# Patient Record
Sex: FEMALE | Race: ASIAN | Employment: FULL TIME | URBAN - METROPOLITAN AREA
[De-identification: names, ages, dates, MRNs, and addresses within clinical notes are randomized per-mention and may not be internally consistent; named-entity substitution may affect disease eponyms.]

---

## 2017-12-26 ENCOUNTER — APPOINTMENT (OUTPATIENT)
Dept: CT IMAGING | Facility: CLINIC | Age: 57
End: 2017-12-26
Attending: EMERGENCY MEDICINE

## 2017-12-26 ENCOUNTER — HOSPITAL ENCOUNTER (EMERGENCY)
Facility: CLINIC | Age: 57
Discharge: HOME | End: 2017-12-26
Attending: EMERGENCY MEDICINE | Admitting: EMERGENCY MEDICINE

## 2017-12-26 VITALS
RESPIRATION RATE: 20 BRPM | HEART RATE: 78 BPM | TEMPERATURE: 98.2 F | DIASTOLIC BLOOD PRESSURE: 95 MMHG | OXYGEN SATURATION: 98 % | SYSTOLIC BLOOD PRESSURE: 144 MMHG

## 2017-12-26 DIAGNOSIS — R91.1 PULMONARY NODULE: ICD-10-CM

## 2017-12-26 DIAGNOSIS — R10.32 LLQ ABDOMINAL PAIN: ICD-10-CM

## 2017-12-26 LAB
ALBUMIN UR-MCNC: NEGATIVE MG/DL
ANION GAP SERPL CALCULATED.3IONS-SCNC: 10 MMOL/L (ref 3–14)
APPEARANCE UR: CLEAR
BASOPHILS # BLD AUTO: 0.1 10E9/L (ref 0–0.2)
BASOPHILS NFR BLD AUTO: 0.5 %
BILIRUB UR QL STRIP: NEGATIVE
BUN SERPL-MCNC: 22 MG/DL (ref 7–30)
CALCIUM SERPL-MCNC: 8.9 MG/DL (ref 8.5–10.1)
CHLORIDE SERPL-SCNC: 107 MMOL/L (ref 94–109)
CO2 SERPL-SCNC: 23 MMOL/L (ref 20–32)
COLOR UR AUTO: ABNORMAL
CREAT SERPL-MCNC: 0.59 MG/DL (ref 0.52–1.04)
DIFFERENTIAL METHOD BLD: NORMAL
EOSINOPHIL # BLD AUTO: 0.2 10E9/L (ref 0–0.7)
EOSINOPHIL NFR BLD AUTO: 2.2 %
ERYTHROCYTE [DISTWIDTH] IN BLOOD BY AUTOMATED COUNT: 12.4 % (ref 10–15)
GFR SERPL CREATININE-BSD FRML MDRD: >90 ML/MIN/1.7M2
GLUCOSE SERPL-MCNC: 85 MG/DL (ref 70–99)
GLUCOSE UR STRIP-MCNC: NEGATIVE MG/DL
HCT VFR BLD AUTO: 41.3 % (ref 35–47)
HGB BLD-MCNC: 13.9 G/DL (ref 11.7–15.7)
HGB UR QL STRIP: NEGATIVE
IMM GRANULOCYTES # BLD: 0 10E9/L (ref 0–0.4)
IMM GRANULOCYTES NFR BLD: 0.2 %
KETONES UR STRIP-MCNC: NEGATIVE MG/DL
LEUKOCYTE ESTERASE UR QL STRIP: ABNORMAL
LYMPHOCYTES # BLD AUTO: 2.7 10E9/L (ref 0.8–5.3)
LYMPHOCYTES NFR BLD AUTO: 29.3 %
MCH RBC QN AUTO: 32.4 PG (ref 26.5–33)
MCHC RBC AUTO-ENTMCNC: 33.7 G/DL (ref 31.5–36.5)
MCV RBC AUTO: 96 FL (ref 78–100)
MONOCYTES # BLD AUTO: 0.6 10E9/L (ref 0–1.3)
MONOCYTES NFR BLD AUTO: 6.4 %
MUCOUS THREADS #/AREA URNS LPF: PRESENT /LPF
NEUTROPHILS # BLD AUTO: 5.7 10E9/L (ref 1.6–8.3)
NEUTROPHILS NFR BLD AUTO: 61.4 %
NITRATE UR QL: NEGATIVE
NRBC # BLD AUTO: 0 10*3/UL
NRBC BLD AUTO-RTO: 0 /100
PH UR STRIP: 6 PH (ref 5–7)
PLATELET # BLD AUTO: 261 10E9/L (ref 150–450)
POTASSIUM SERPL-SCNC: 3.7 MMOL/L (ref 3.4–5.3)
RBC # BLD AUTO: 4.29 10E12/L (ref 3.8–5.2)
RBC #/AREA URNS AUTO: 1 /HPF (ref 0–2)
SODIUM SERPL-SCNC: 140 MMOL/L (ref 133–144)
SOURCE: ABNORMAL
SP GR UR STRIP: 1.02 (ref 1–1.03)
SQUAMOUS #/AREA URNS AUTO: 1 /HPF (ref 0–1)
UROBILINOGEN UR STRIP-MCNC: 0 MG/DL (ref 0–2)
WBC # BLD AUTO: 9.3 10E9/L (ref 4–11)
WBC #/AREA URNS AUTO: 1 /HPF (ref 0–2)

## 2017-12-26 PROCEDURE — 85025 COMPLETE CBC W/AUTO DIFF WBC: CPT | Performed by: EMERGENCY MEDICINE

## 2017-12-26 PROCEDURE — 81001 URINALYSIS AUTO W/SCOPE: CPT | Performed by: EMERGENCY MEDICINE

## 2017-12-26 PROCEDURE — 99285 EMERGENCY DEPT VISIT HI MDM: CPT | Mod: 25

## 2017-12-26 PROCEDURE — 25000128 H RX IP 250 OP 636: Performed by: EMERGENCY MEDICINE

## 2017-12-26 PROCEDURE — 74177 CT ABD & PELVIS W/CONTRAST: CPT

## 2017-12-26 PROCEDURE — 96374 THER/PROPH/DIAG INJ IV PUSH: CPT | Mod: 59

## 2017-12-26 PROCEDURE — 80048 BASIC METABOLIC PNL TOTAL CA: CPT | Performed by: EMERGENCY MEDICINE

## 2017-12-26 RX ORDER — IOPAMIDOL 755 MG/ML
500 INJECTION, SOLUTION INTRAVASCULAR ONCE
Status: COMPLETED | OUTPATIENT
Start: 2017-12-26 | End: 2017-12-26

## 2017-12-26 RX ORDER — KETOROLAC TROMETHAMINE 30 MG/ML
30 INJECTION, SOLUTION INTRAMUSCULAR; INTRAVENOUS ONCE
Status: COMPLETED | OUTPATIENT
Start: 2017-12-26 | End: 2017-12-26

## 2017-12-26 RX ORDER — HYDROCODONE BITARTRATE AND ACETAMINOPHEN 5; 325 MG/1; MG/1
1-2 TABLET ORAL EVERY 4 HOURS PRN
Qty: 15 TABLET | Refills: 0 | Status: ON HOLD | OUTPATIENT
Start: 2017-12-26 | End: 2020-11-23

## 2017-12-26 RX ADMIN — SODIUM CHLORIDE 58 ML: 9 INJECTION, SOLUTION INTRAVENOUS at 14:47

## 2017-12-26 RX ADMIN — KETOROLAC TROMETHAMINE 30 MG: 30 INJECTION, SOLUTION INTRAMUSCULAR at 14:18

## 2017-12-26 RX ADMIN — IOPAMIDOL 72 ML: 755 INJECTION, SOLUTION INTRAVENOUS at 14:47

## 2017-12-26 ASSESSMENT — ENCOUNTER SYMPTOMS
VOMITING: 0
FREQUENCY: 1
FEVER: 0
ABDOMINAL PAIN: 1
DIARRHEA: 0
BACK PAIN: 1
DIFFICULTY URINATING: 0
NAUSEA: 0
DYSURIA: 0
HEMATURIA: 0

## 2017-12-26 NOTE — DISCHARGE INSTRUCTIONS
Abdominal Pain    Abdominal pain is pain in the stomach or belly area. Everyone has this pain from time to time. In many cases it goes away on its own. But abdominal pain can sometimes be due to a serious problem, such as appendicitis. So it s important to know when to seek help.  Causes of abdominal pain  There are many possible causes of abdominal pain. Common causes in adults include:    Constipation, diarrhea, or gas    Stomach acid flowing back up into the esophagus (acid reflux or heartburn)    Severe acid reflux, called GERD (gastroesophageal reflux disease)    A sore in the lining of the stomach or small intestine (peptic ulcer)    Inflammation of the gallbladder, liver, or pancreas    Gallstones or kidney stones    Appendicitis     Intestinal blockage     An internal organ pushing through a muscle or other tissue (hernia)    Urinary tract infections    In women, menstrual cramps, fibroids, or endometriosis    Inflammation or infection of the intestines  Diagnosing the cause of abdominal pain  Your healthcare provider will do a physical exam help find the cause of your pain. If needed, tests will be ordered. Belly pain has many possible causes. So it can be hard to find the reason for your pain. Giving details about your pain can help. Tell your provider where and when you feel the pain, and what makes it better or worse. Also let your provider know if you have other symptoms such as:    Fever    Tiredness    Upset stomach (nausea)    Vomiting    Changes in bathroom habits  Treating abdominal pain  Some causes of pain need emergency medical treatment right away. These include appendicitis or a bowel blockage. Other problems can be treated with rest, fluids, or medicines. Your healthcare provider can give you specific instructions for treatment or self-care based on what is causing your pain.  If you have vomiting or diarrhea, sip water or other clear fluids. When you are ready to eat solid foods again,  start with small amounts of easy-to-digest, low-fat foods. These include apple sauce, toast, or crackers.   When to seek medical care  Call 911 or go to the hospital right away if you:    Can t pass stool and are vomiting    Are vomiting blood or have bloody diarrhea or black, tarry diarrhea    Have chest, neck, or shoulder pain    Feel like you might pass out    Have pain in your shoulder blades with nausea    Have sudden, severe belly pain    Have new, severe pain unlike any you have felt before    Have a belly that is rigid, hard, and tender to touch  Call your healthcare provider if you have:    Pain for more than 5 days    Bloating for more than 2 days    Diarrhea for more than 5 days    A fever of 100.4 F (38.0 C) or higher, or as directed by your provider    Pain that gets worse    Weight loss for no reason    Continued lack of appetite    Blood in your stool  How to prevent abdominal pain  Here are some tips to help prevent abdominal pain:    Eat smaller amounts of food at one time.    Avoid greasy, fried, or other high-fat foods.    Avoid foods that give you gas.    Exercise regularly.    Drink plenty of fluids.  To help prevent GERD symptoms:    Quit smoking.    Reduce alcohol and certain foods that increase stomach acid.    Avoid aspirin and over-the-counter pain and fever medicines (NSAIDS or nonsteroidal anti-inflammatory drugs), if possible    Lose extra weight.    Finish eating at least 2 hours before you go to bed or lie down.    Raise the head of your bed.  Date Last Reviewed: 7/1/2016 2000-2017 [employer]. [address] [place] [address], [place] [address]. All rights reserved. This information is not intended as a substitute for professional medical care. Always follow your healthcare professional's instructions.        Pulmonary Nodule  A pulmonary nodule is small area of abnormal tissue in the lung. It is usually found on an X-ray taken for other reasons. It is a single spot (lesion) up  to about an inch in size, surrounded by normal lung tissue.  Most nodules are not cancerous (benign). However, a nodule could be an early stage of lung cancer. Or it may be a sign of cancer that has spread from another part of the body. When a nodule is found on a chest X-ray, further testing is needed to determine if it is benign or cancerous (malignant). To give your healthcare provider more information about the nodule, you may have one or more of these tests:    Comparison of a new X-ray to earlier X-rays    Chest CT scan    Bronchoscopy (a procedure that allows the healthcare provider to see the air passages inside the lung)    Needle biopsy  Test results    If your nodule is benign, continued follow-up over the next 5 years is usually advised.    If tests do not determine whether your nodule is benign or malignant, surgery may be advised.    If tests show that the nodule is definitely malignant, surgery will probably be advised.  The best survival rates from lung cancer occur when the original tumor is small (less than 1 inch). Follow your healthcare provider's advice on the timing of further testing. Prompt treatment gives the best chance of curing lung cancer.  Prevention  Smoking remains one of the biggest risk factors for lung cancer. If you smoke, it is essential that you quit to lower your risk of lung cancer. Talk to your healthcare provider about things that can help you quit, including medicines and support groups. See the following websites for more information:    www.smokefree.gov    www.quitnet.com  Home care  Most people with a pulmonary nodule have no symptoms. So no special home care is required. You may return to your usual activities and diet.  Follow-up care  Follow up with your healthcare provider, or as advised.  More information about lung cancer is available from these resources:    American Lung Association: 357.592.7552, www.lung.org    [employer] [place]: 311.480.6436,  www.cancer.gov  When to seek medical advice  Call your healthcare provider right away if any of these occur:    Fever of 100.4 F (38 C) or higher    Unintended weight change  Call 911  Contact emergency services right away if any of these occur:    Coughing up blood    Chest pain or shortness of breath  Date Last Reviewed: 9/13/2015 2000-2017 [employer]. [address] [place] [address]. All rights reserved. This information is not intended as a substitute for professional medical care. Always follow your healthcare professional's instructions.

## 2017-12-26 NOTE — ED NOTES
Patient arrives complaining of left side abdomen and back pain that started on Friday and has gotten worse. She is alert and oriented, ABCs intact.

## 2017-12-26 NOTE — ED AVS SNAPSHOT
[facility] Emergency Department    [address]    [facility] [address]    Phone:  [phone]    Fax:  [phone]                                       Day Russell   MRN: [id number]    Department:  [facility] Emergency Department   Date of Visit:  12/26/2017           Patient Information     Date Of Birth          1960        Your diagnoses for this visit were:     LLQ abdominal pain     Pulmonary nodule        You were seen by James Whitman DO.      Follow-up Information     Follow up with Keli Guzmán PA-C. Call in 2 days.    Specialty:  Pediatrics    Why:  As needed    Contact information:    [facility]  [address]  [facility] [address]  [phone]          Follow up with [facility] Emergency Department.    Specialty:  EMERGENCY MEDICINE    Why:  If symptoms worsen    Contact information:    [address]  [facility] [address]  [phone]        Discharge Instructions         Abdominal Pain    Abdominal pain is pain in the stomach or belly area. Everyone has this pain from time to time. In many cases it goes away on its own. But abdominal pain can sometimes be due to a serious problem, such as appendicitis. So it s important to know when to seek help.  Causes of abdominal pain  There are many possible causes of abdominal pain. Common causes in adults include:    Constipation, diarrhea, or gas    Stomach acid flowing back up into the esophagus (acid reflux or heartburn)    Severe acid reflux, called GERD (gastroesophageal reflux disease)    A sore in the lining of the stomach or small intestine (peptic ulcer)    Inflammation of the gallbladder, liver, or pancreas    Gallstones or kidney stones    Appendicitis     Intestinal blockage     An internal organ pushing through a muscle or other tissue (hernia)    Urinary tract infections    In women, menstrual cramps, fibroids, or  endometriosis    Inflammation or infection of the intestines  Diagnosing the cause of abdominal pain  Your healthcare provider will do a physical exam help find the cause of your pain. If needed, tests will be ordered. Belly pain has many possible causes. So it can be hard to find the reason for your pain. Giving details about your pain can help. Tell your provider where and when you feel the pain, and what makes it better or worse. Also let your provider know if you have other symptoms such as:    Fever    Tiredness    Upset stomach (nausea)    Vomiting    Changes in bathroom habits  Treating abdominal pain  Some causes of pain need emergency medical treatment right away. These include appendicitis or a bowel blockage. Other problems can be treated with rest, fluids, or medicines. Your healthcare provider can give you specific instructions for treatment or self-care based on what is causing your pain.  If you have vomiting or diarrhea, sip water or other clear fluids. When you are ready to eat solid foods again, start with small amounts of easy-to-digest, low-fat foods. These include apple sauce, toast, or crackers.   When to seek medical care  Call 911 or go to the hospital right away if you:    Can t pass stool and are vomiting    Are vomiting blood or have bloody diarrhea or black, tarry diarrhea    Have chest, neck, or shoulder pain    Feel like you might pass out    Have pain in your shoulder blades with nausea    Have sudden, severe belly pain    Have new, severe pain unlike any you have felt before    Have a belly that is rigid, hard, and tender to touch  Call your healthcare provider if you have:    Pain for more than 5 days    Bloating for more than 2 days    Diarrhea for more than 5 days    A fever of 100.4 F (38.0 C) or higher, or as directed by your provider    Pain that gets worse    Weight loss for no reason    Continued lack of appetite    Blood in your stool  How to prevent abdominal pain  Here are  some tips to help prevent abdominal pain:    Eat smaller amounts of food at one time.    Avoid greasy, fried, or other high-fat foods.    Avoid foods that give you gas.    Exercise regularly.    Drink plenty of fluids.  To help prevent GERD symptoms:    Quit smoking.    Reduce alcohol and certain foods that increase stomach acid.    Avoid aspirin and over-the-counter pain and fever medicines (NSAIDS or nonsteroidal anti-inflammatory drugs), if possible    Lose extra weight.    Finish eating at least 2 hours before you go to bed or lie down.    Raise the head of your bed.  Date Last Reviewed: 7/1/2016 2000-2017 [employer]. [address] [place] [id number]. All rights reserved. This information is not intended as a substitute for professional medical care. Always follow your healthcare professional's instructions.        Pulmonary Nodule  A pulmonary nodule is small area of abnormal tissue in the lung. It is usually found on an X-ray taken for other reasons. It is a single spot (lesion) up to about an inch in size, surrounded by normal lung tissue.  Most nodules are not cancerous (benign). However, a nodule could be an early stage of lung cancer. Or it may be a sign of cancer that has spread from another part of the body. When a nodule is found on a chest X-ray, further testing is needed to determine if it is benign or cancerous (malignant). To give your healthcare provider more information about the nodule, you may have one or more of these tests:    Comparison of a new X-ray to earlier X-rays    Chest CT scan    Bronchoscopy (a procedure that allows the healthcare provider to see the air passages inside the lung)    Needle biopsy  Test results    If your nodule is benign, continued follow-up over the next 5 years is usually advised.    If tests do not determine whether your nodule is benign or malignant, surgery may be advised.    If tests show that the nodule is definitely malignant,  surgery will probably be advised.  The best survival rates from lung cancer occur when the original tumor is small (less than 1 inch). Follow your healthcare provider's advice on the timing of further testing. Prompt treatment gives the best chance of curing lung cancer.  Prevention  Smoking remains one of the biggest risk factors for lung cancer. If you smoke, it is essential that you quit to lower your risk of lung cancer. Talk to your healthcare provider about things that can help you quit, including medicines and support groups. See the following websites for more information:    www.smokefree.gov    www.quitnet.com  Home care  Most people with a pulmonary nodule have no symptoms. So no special home care is required. You may return to your usual activities and diet.  Follow-up care  Follow up with your healthcare provider, or as advised.  More information about lung cancer is available from these resources:    American Lung Association: 499.590.3358, www.lung.org    National Cancer [place]: 474.390.3016, www.cancer.gov  When to seek medical advice  Call your healthcare provider right away if any of these occur:    Fever of 100.4 F (38 C) or higher    Unintended weight change  Call 911  Contact emergency services right away if any of these occur:    Coughing up blood    Chest pain or shortness of breath  Date Last Reviewed: 9/13/2015 2000-2017 [employer]. [address] [place] [address], [place] [address]. All rights reserved. This information is not intended as a substitute for professional medical care. Always follow your healthcare professional's instructions.          24 Hour Appointment Hotline       To make an appointment at any [facility], call [phone] ([phone]). If you don't have a family doctor or clinic, we will help you find one. [facility] are conveniently located to serve the needs of you and your family.             Review of your medicines      START taking         Dose / Directions Last dose taken    HYDROcodone-acetaminophen 5-325 MG per tablet   Commonly known as:  NORCO   Dose:  1-2 tablet   Quantity:  15 tablet        Take 1-2 tablets by mouth every 4 hours as needed for moderate to severe pain   Refills:  0          Our records show that you are taking the medicines listed below. If these are incorrect, please call your family doctor or clinic.        Dose / Directions Last dose taken    ADVIL 200 MG tablet   Quantity:  120   Generic drug:  ibuprofen        1 TABLET EVERY 4 TO 6 HOURS AS NEEDED   Refills:  0        celeBREX 200 MG capsule   Quantity:  30   Generic drug:  celecoxib        1 Capsule daily   Refills:  5        * DEPO-PROVERA 150 MG/ML injection   Generic drug:  medroxyPROGESTERone        1 ml every 12 weeks   Refills:  0        * DEPO-PROVERA 150 MG/ML injection   Quantity:  .9   Generic drug:  medroxyPROGESTERone        given now   Refills:  0        * DEPO-PROVERA 150 MG/ML injection   Quantity:  1 ml   Generic drug:  medroxyPROGESTERone        1 ml every 12 weeks   Refills:  4        naproxen 500 MG tablet   Commonly known as:  NAPROSYN   Quantity:  60        1 TABLET TWICE DAILY   Refills:  0        omeprazole 40 MG capsule   Commonly known as:  priLOSEC   Quantity:  30        1 CAPSULE DAILY in the morning   Refills:  5        order for DME   Quantity:  2        lft and rt wrist braces   Refills:  0        * Notice:  This list has 3 medication(s) that are the same as other medications prescribed for you. Read the directions carefully, and ask your doctor or other care provider to review them with you.            Prescriptions were sent or printed at these locations (1 Prescription)                   Other Prescriptions                Printed at Department/Unit printer (1 of 1)         HYDROcodone-acetaminophen (NORCO) 5-325 MG per tablet                Procedures and tests performed during your visit     Basic metabolic panel    CBC with platelets  differential    CT Abdomen Pelvis w Contrast    Peripheral IV catheter    UA with Microscopic      Orders Needing Specimen Collection     None      Pending Results     Date and Time Order Name Status Description    12/26/2017 1331 CT Abdomen Pelvis w Contrast Preliminary             Pending Culture Results     No orders found from 12/24/2017 to 12/27/2017.            Pending Results Instructions     If you had any lab results that were not finalized at the time of your Discharge, you can call the ED Lab Result RN at [phone]. You will be contacted by this team for any positive Lab results or changes in treatment. The nurses are available 7 days a week from 10A to 6:30P.  You can leave a message 24 hours per day and they will return your call.        Test Results From Your Hospital Stay        12/26/2017  1:06 PM      Component Results     Component Value Ref Range & Units Status    Color Urine Straw  Final    Appearance Urine Clear  Final    Glucose Urine Negative NEG^Negative mg/dL Final    Bilirubin Urine Negative NEG^Negative Final    Ketones Urine Negative NEG^Negative mg/dL Final    Specific Gravity Urine 1.019 1.003 - 1.035 Final    Blood Urine Negative NEG^Negative Final    pH Urine 6.0 5.0 - 7.0 pH Final    Protein Albumin Urine Negative NEG^Negative mg/dL Final    Urobilinogen mg/dL 0.0 0.0 - 2.0 mg/dL Final    Nitrite Urine Negative NEG^Negative Final    Leukocyte Esterase Urine Trace (A) NEG^Negative Final    Source Midstream Urine  Final    WBC Urine 1 0 - 2 /HPF Final    RBC Urine 1 0 - 2 /HPF Final    Squamous Epithelial /HPF Urine 1 0 - 1 /HPF Final    Mucous Urine Present (A) NEG^Negative /LPF Final         12/26/2017  2:13 PM      Component Results     Component Value Ref Range & Units Status    WBC 9.3 4.0 - 11.0 10e9/L Final    RBC Count 4.29 3.8 - 5.2 10e12/L Final    Hemoglobin 13.9 11.7 - 15.7 g/dL Final    Hematocrit 41.3 35.0 - 47.0 % Final    MCV 96 78 - 100 fl Final    MCH 32.4 26.5 -  33.0 pg Final    MCHC 33.7 31.5 - 36.5 g/dL Final    RDW 12.4 10.0 - 15.0 % Final    Platelet Count 261 150 - 450 10e9/L Final    Diff Method Automated Method  Final    % Neutrophils 61.4 % Final    % Lymphocytes 29.3 % Final    % Monocytes 6.4 % Final    % Eosinophils 2.2 % Final    % Basophils 0.5 % Final    % Immature Granulocytes 0.2 % Final    Nucleated RBCs 0 0 /100 Final    Absolute Neutrophil 5.7 1.6 - 8.3 10e9/L Final    Absolute Lymphocytes 2.7 0.8 - 5.3 10e9/L Final    Absolute Monocytes 0.6 0.0 - 1.3 10e9/L Final    Absolute Eosinophils 0.2 0.0 - 0.7 10e9/L Final    Absolute Basophils 0.1 0.0 - 0.2 10e9/L Final    Abs Immature Granulocytes 0.0 0 - 0.4 10e9/L Final    Absolute Nucleated RBC 0.0  Final         12/26/2017  2:29 PM      Component Results     Component Value Ref Range & Units Status    Sodium 140 133 - 144 mmol/L Final    Potassium 3.7 3.4 - 5.3 mmol/L Final    Chloride 107 94 - 109 mmol/L Final    Carbon Dioxide 23 20 - 32 mmol/L Final    Anion Gap 10 3 - 14 mmol/L Final    Glucose 85 70 - 99 mg/dL Final    Urea Nitrogen 22 7 - 30 mg/dL Final    Creatinine 0.59 0.52 - 1.04 mg/dL Final    GFR Estimate >90 >60 mL/min/1.7m2 Final    Non  GFR Calc    GFR Estimate If Black >90 >60 mL/min/1.7m2 Final    African American GFR Calc    Calcium 8.9 8.5 - 10.1 mg/dL Final         12/26/2017  3:03 PM      Narrative     CT ABDOMEN AND PELVIS WITH CONTRAST   12/26/2017 2:48 PM     HISTORY: Left lower quadrant pain.    TECHNIQUE: CT abdomen and pelvis with 72mL Isovue-370 IV. Radiation  dose for this scan was reduced using automated exposure control,  adjustment of the mA and/or kV according to patient size, or iterative  reconstruction technique.    COMPARISON: None.    FINDINGS: No acute infiltrate change of the bowel. No evidence for  bowel obstruction. Normal appendix. No abscess or free air. No  urolithiasis or hydronephrosis identified. Vascular calcifications  noted. Somewhat  heterogeneous appearance of the liver may represent  mild fatty infiltration. Gallbladder, spleen, adrenals, and pancreas  shows no acute abnormalities. Tiny 0.2 cm left lower lobe nodule  series 2 image 7.        Impression     IMPRESSION:  1. No acute abnormality is seen.  2. Mildly heterogeneous liver may represents fatty infiltration.  3. Small pulmonary nodule at the left lung base is technically  indeterminant. See below for follow up imaging guidelines.    Recommendations for one or multiple incidental lung nodules < 6mm :    Low risk patients: No routine follow-up.    High risk patients: Optional follow-up CT at 12 months; if  unchanged, no further follow-up.    *Low Risk: Minimal or absent history of smoking or other known risk  factors.  *Nonsolid (ground glass) or partly solid nodules may require longer  follow-up to exclude indolent adenocarcinoma.  *Recommendations based on Guidelines for the Management of Incidental  Pulmonary Nodules Detected at CT: From the Fleischner Society 2017,  Radiology 2017.                Clinical Quality Measure: Blood Pressure Screening     Your blood pressure was checked while you were in the emergency department today. The last reading we obtained was  BP: 142/89 . Please read the guidelines below about what these numbers mean and what you should do about them.  If your systolic blood pressure (the top number) is less than 120 and your diastolic blood pressure (the bottom number) is less than 80, then your blood pressure is normal. There is nothing more that you need to do about it.  If your systolic blood pressure (the top number) is 120-139 or your diastolic blood pressure (the bottom number) is 80-89, your blood pressure may be higher than it should be. You should have your blood pressure rechecked within a year by a primary care provider.  If your systolic blood pressure (the top number) is 140 or greater or your diastolic blood pressure (the bottom number) is 90 or  "greater, you may have high blood pressure. High blood pressure is treatable, but if left untreated over time it can put you at risk for heart attack, stroke, or kidney failure. You should have your blood pressure rechecked by a primary care provider within the next 4 weeks.  If your provider in the emergency department today gave you specific instructions to follow-up with your doctor or provider even sooner than that, you should follow that instruction and not wait for up to 4 weeks for your follow-up visit.        Thank you for choosing [facility]       Thank you for choosing [facility] for your care. Our goal is always to provide you with excellent care. Hearing back from our patients is one way we can continue to improve our services. Please take a few minutes to complete the written survey that you may receive in the mail after you visit with us. Thank you!        [facility] Information     [facility] lets you send messages to your doctor, view your test results, renew your prescriptions, schedule appointments and more. To sign up, go to [email or web address] . Click on \"Log in\" on the left side of the screen, which will take you to the Welcome page. Then click on \"Sign up Now\" on the right side of the page.     You will be asked to enter the access code listed below, as well as some personal information. Please follow the directions to create your username and password.     Your access code is: [email or web address]  Expires: 3/26/2018  3:28 PM     Your access code will  in 90 days. If you need help or a new code, please call your [facility] clinic or [phone].        Care EveryWhere ID     This is your Care EveryWhere ID. This could be used by other organizations to access your [facility] medical records  [id number]        Equal Access to Services     NEYMAR BLANCAS : koby Reynolds qaybta kaalmada adeegyada, waxay idiin hayaan adeeg kharash la'aan ah. So [facility] [phone].    ATENCIÓN: " Si habla esteban, tiene a mary disposición servicios gratuitos de asistencia lingüística. Llame al [phone].    We comply with applicable federal civil rights laws and [place] laws. We do not discriminate on the basis of race, color, national origin, age, disability, sex, sexual orientation, or gender identity.            After Visit Summary       This is your record. Keep this with you and show to your community pharmacist(s) and doctor(s) at your next visit.

## 2017-12-26 NOTE — ED AVS SNAPSHOT
[facility] Emergency Department    [address]    [facility] [address]    Phone:  [phone]    Fax:  [phone]                                       Day Russell   MRN: [id number]    Department:  [facility] Emergency Department   Date of Visit:  12/26/2017           After Visit Summary Signature Page     I have received my discharge instructions, and my questions have been answered. I have discussed any challenges I see with this plan with the nurse or doctor.    ..........................................................................................................................................  Patient/Patient Representative Signature      ..........................................................................................................................................  Patient Representative Print Name and Relationship to Patient    ..................................................               ................................................  Date                                            Time    ..........................................................................................................................................  Reviewed by Signature/Title    ...................................................              ..............................................  Date                                                            Time

## 2017-12-26 NOTE — ED PROVIDER NOTES
"  History     Chief Complaint:  Abdominal Pain    History provided by the patient's daughter secondary to a language barrier.   The history is provided by the patient.      Day Russell is a 57 year old female with a history of esophageal reflux and hyperlipidemia who presents to the emergency department today for evaluation of abdominal pain. The patient reports four days ago she developed left lower quadrant abdominal pain that radiates to her lower back and associated with a throbbing other to her left upper thigh and increased urinary frequency. Due to worsening symptoms, she presents to the ED for further evaluation. She describes her abdominal pain as it \"feels hot.\" The patient denies fevers, nausea, vomiting, other urinary symptoms, and diarrhea.     Allergies:  No Known Drug Allergies     Medications:    DEPO-PROVERA 150 MG/ML IM SUSP  OMEPRAZOLE 40 MG OR CPDR  CELEBREX 200 MG OR CAPS  NAPROXEN 500 MG OR TABS    Past Medical History:    Esophageal reflux  Hyperlipidemia    Past Surgical History:    History reviewed. No pertinent past surgical history.    Family History:    History reviewed. No pertinent family history.     Social History:  The patient was accompanied to the ED by her daughter.  Smoking Status: Never  Alcohol Use: Yes  Marital Status:        Review of Systems   Constitutional: Negative for fever.   Gastrointestinal: Positive for abdominal pain (LLQ). Negative for diarrhea, nausea and vomiting.   Genitourinary: Positive for frequency. Negative for decreased urine volume, difficulty urinating, dysuria, hematuria and urgency.   Musculoskeletal: Positive for back pain.   All other systems reviewed and are negative.    Physical Exam   First Vitals:  BP: (!) 150/99  Pulse: 78  Temp: 98.2  F (36.8  C)  Resp: 20  SpO2: 100 %    Physical Exam  Constitutional: Patient appears well-developed and well-nourished. Patient is in no acute distress  HENT:                         Head: No external " signs of trauma noted.                        Eyes: Conjunctivae are normal. Pupils are equal, round, and reactive to light.   Cardiovascular:                         Normal rate, regular rhythm and normal heart sounds.                          Exam reveals no friction rub.                          No murmur heard.  Pulmonary/Chest:                         Effort normal and breath sounds normal.                         No respiratory distress.                         There are no wheezes.                         There are no rales.   Abdominal:                         Soft.                         Bowel sounds normal.                         There is no distension.                         There is LLQ tenderness and mild suprapubic tenderness.                         There is no rebound or guarding.   Musculoskeletal:                         Normal range of motion.                         Normal Tone                        No CVA tenderness  Neurological: Patient is alert and oriented to person, place, and time.   Skin: Skin is warm and dry. Patient is not diaphoretic.       Emergency Department Course     Imaging:  Radiology findings were communicated with the patient and family who voiced understanding of the findings.  CT Abdomen pelvis w Contrast   IMPRESSION:  1. No acute abnormality is seen.  2. Mildly heterogeneous liver may represents fatty infiltration.  3. Small pulmonary nodule at the left lung base is technically  indeterminant. See below for follow up imaging guidelines.  Report per radiology     Laboratory:  Laboratory findings were communicated with the patient and family who voiced understanding of the findings.  CBC: WNL. (WBC 9.3, HGB 13.9, )   BMP: AWNL (Creatinine 0.59)  UA: clear straw urine, leukocyte esterase, mucous present o/w WNL    Interventions:  1418 Toradol 30mg IV     Emergency Department Course:  Nursing notes and vitals reviewed.  The patient provided a urine sample here in  the emergency department. This was sent for laboratory testing, findings above.  The patient was sent for a CT Abdomen pelvis w Contrast while in the emergency department, results above.   IV was inserted and blood was drawn for laboratory testing, results above.  1330: I performed an exam of the patient as documented above.   1515: Patient rechecked and updated.   Findings and plan explained to the Patient and daughter. Patient discharged home with instructions regarding supportive care, medications, and reasons to return. The importance of close follow-up was reviewed. The patient was prescribed norco  I personally reviewed the laboratory and imaging results with the Patient and daughter and answered all related questions prior to discharge.    Impression & Plan      Medical Decision Making:  The patient presents to the ER for evaluation of left lower quadrant and left side pain. Please see the HPI and exam for specifics. The patient's exam was clinically suggestive of diverticulitis. However, the CT did not reveal any intraabdominal findings. The CT does note a small 0.2cm left lower lobe nodule. Based on the criteria outlined in the report, the patient does not need any follow up as she is low risk. I have notified the patient of the finding and encouraged her to follow up with her primary care provider.  Her urine analysis is normal and the patient has remained stable in the ED. There is no other external evidence of trauma and at this time I believe the patient can be discharged and follow up in the outpatient setting. I will encourage her and her daughter to return should there be worsening symptoms including increase in fever, vomiting, diarrhea, or inability to tolerate the pain at home. Anticipatory guidance given prior to discharge.           Diagnosis:    ICD-10-CM    1. LLQ abdominal pain R10.32      Disposition:  Discharged to home    Discharge Medications:  New Prescriptions     HYDROCODONE-ACETAMINOPHEN (NORCO) 5-325 MG PER TABLET    Take 1-2 tablets by mouth every 4 hours as needed for moderate to severe pain       Scribe Disclosure:  I, Carmen Wolfe, am serving as a scribe at 1:20 PM on 12/26/2017 to document services personally performed by James Whitman DO based on my observations and the provider's statements to me.     12/26/2017   [facility] EMERGENCY DEPARTMENT       James Whitman DO  12/26/17 [id number]

## 2020-11-23 ENCOUNTER — APPOINTMENT (OUTPATIENT)
Dept: CT IMAGING | Facility: CLINIC | Age: 60
End: 2020-11-23
Attending: EMERGENCY MEDICINE
Payer: COMMERCIAL

## 2020-11-23 ENCOUNTER — HOSPITAL ENCOUNTER (INPATIENT)
Facility: CLINIC | Age: 60
LOS: 11 days | Discharge: HOME HEALTH | End: 2020-12-05
Attending: EMERGENCY MEDICINE | Admitting: HOSPITALIST
Payer: COMMERCIAL

## 2020-11-23 DIAGNOSIS — R09.02 HYPOXIA: ICD-10-CM

## 2020-11-23 DIAGNOSIS — U07.1 2019 NOVEL CORONAVIRUS DISEASE (COVID-19): ICD-10-CM

## 2020-11-23 DIAGNOSIS — K59.09 OTHER CONSTIPATION: Primary | ICD-10-CM

## 2020-11-23 DIAGNOSIS — K21.9 GASTROESOPHAGEAL REFLUX DISEASE WITHOUT ESOPHAGITIS: ICD-10-CM

## 2020-11-23 LAB
ALBUMIN SERPL-MCNC: 2.8 G/DL (ref 3.4–5)
ALP SERPL-CCNC: 73 U/L (ref 40–150)
ALT SERPL W P-5'-P-CCNC: 50 U/L (ref 0–50)
ANION GAP SERPL CALCULATED.3IONS-SCNC: 6 MMOL/L (ref 3–14)
APTT PPP: 36 SEC (ref 22–37)
AST SERPL W P-5'-P-CCNC: 75 U/L (ref 0–45)
BASOPHILS # BLD AUTO: 0 10E9/L (ref 0–0.2)
BASOPHILS NFR BLD AUTO: 0.2 %
BILIRUB SERPL-MCNC: 0.2 MG/DL (ref 0.2–1.3)
BUN SERPL-MCNC: 9 MG/DL (ref 7–30)
CALCIUM SERPL-MCNC: 8.2 MG/DL (ref 8.5–10.1)
CHLORIDE SERPL-SCNC: 110 MMOL/L (ref 94–109)
CK SERPL-CCNC: 45 U/L (ref 30–225)
CO2 SERPL-SCNC: 23 MMOL/L (ref 20–32)
CREAT SERPL-MCNC: 0.65 MG/DL (ref 0.52–1.04)
CRP SERPL-MCNC: 110 MG/L (ref 0–8)
D DIMER PPP FEU-MCNC: 0.7 UG/ML FEU (ref 0–0.5)
DIFFERENTIAL METHOD BLD: NORMAL
EOSINOPHIL # BLD AUTO: 0 10E9/L (ref 0–0.7)
EOSINOPHIL NFR BLD AUTO: 0.2 %
ERYTHROCYTE [DISTWIDTH] IN BLOOD BY AUTOMATED COUNT: 12.4 % (ref 10–15)
FERRITIN SERPL-MCNC: 3273 NG/ML (ref 8–252)
GFR SERPL CREATININE-BSD FRML MDRD: >90 ML/MIN/{1.73_M2}
GLUCOSE SERPL-MCNC: 94 MG/DL (ref 70–99)
HCT VFR BLD AUTO: 44.3 % (ref 35–47)
HGB BLD-MCNC: 14.5 G/DL (ref 11.7–15.7)
IMM GRANULOCYTES # BLD: 0 10E9/L (ref 0–0.4)
IMM GRANULOCYTES NFR BLD: 0.4 %
INR PPP: 0.82 (ref 0.86–1.14)
INTERPRETATION ECG - MUSE: NORMAL
LDH SERPL L TO P-CCNC: 462 U/L (ref 81–234)
LYMPHOCYTES # BLD AUTO: 1 10E9/L (ref 0.8–5.3)
LYMPHOCYTES NFR BLD AUTO: 18.3 %
MCH RBC QN AUTO: 31.9 PG (ref 26.5–33)
MCHC RBC AUTO-ENTMCNC: 32.7 G/DL (ref 31.5–36.5)
MCV RBC AUTO: 98 FL (ref 78–100)
MONOCYTES # BLD AUTO: 0.4 10E9/L (ref 0–1.3)
MONOCYTES NFR BLD AUTO: 7.3 %
NEUTROPHILS # BLD AUTO: 4.1 10E9/L (ref 1.6–8.3)
NEUTROPHILS NFR BLD AUTO: 73.6 %
NRBC # BLD AUTO: 0 10*3/UL
NRBC BLD AUTO-RTO: 0 /100
NT-PROBNP SERPL-MCNC: 39 PG/ML (ref 0–900)
PLATELET # BLD AUTO: 212 10E9/L (ref 150–450)
POTASSIUM SERPL-SCNC: 3.4 MMOL/L (ref 3.4–5.3)
PROT SERPL-MCNC: 7.7 G/DL (ref 6.8–8.8)
RBC # BLD AUTO: 4.54 10E12/L (ref 3.8–5.2)
RETICS # AUTO: 22.8 10E9/L (ref 25–95)
RETICS/RBC NFR AUTO: 0.6 % (ref 0.5–2)
SODIUM SERPL-SCNC: 139 MMOL/L (ref 133–144)
TROPONIN I SERPL-MCNC: <0.015 UG/L (ref 0–0.04)
WBC # BLD AUTO: 5.6 10E9/L (ref 4–11)

## 2020-11-23 PROCEDURE — 83520 IMMUNOASSAY QUANT NOS NONAB: CPT | Performed by: HOSPITALIST

## 2020-11-23 PROCEDURE — 85300 ANTITHROMBIN III ACTIVITY: CPT | Performed by: HOSPITALIST

## 2020-11-23 PROCEDURE — 82728 ASSAY OF FERRITIN: CPT | Performed by: HOSPITALIST

## 2020-11-23 PROCEDURE — 83880 ASSAY OF NATRIURETIC PEPTIDE: CPT | Performed by: EMERGENCY MEDICINE

## 2020-11-23 PROCEDURE — 85045 AUTOMATED RETICULOCYTE COUNT: CPT | Performed by: HOSPITALIST

## 2020-11-23 PROCEDURE — 71275 CT ANGIOGRAPHY CHEST: CPT

## 2020-11-23 PROCEDURE — 258N000003 HC RX IP 258 OP 636: Performed by: EMERGENCY MEDICINE

## 2020-11-23 PROCEDURE — 258N000003 HC RX IP 258 OP 636: Performed by: HOSPITALIST

## 2020-11-23 PROCEDURE — 84484 ASSAY OF TROPONIN QUANT: CPT | Performed by: EMERGENCY MEDICINE

## 2020-11-23 PROCEDURE — 250N000011 HC RX IP 250 OP 636: Performed by: EMERGENCY MEDICINE

## 2020-11-23 PROCEDURE — 96372 THER/PROPH/DIAG INJ SC/IM: CPT | Performed by: HOSPITALIST

## 2020-11-23 PROCEDURE — 85610 PROTHROMBIN TIME: CPT | Performed by: EMERGENCY MEDICINE

## 2020-11-23 PROCEDURE — G0378 HOSPITAL OBSERVATION PER HR: HCPCS

## 2020-11-23 PROCEDURE — 85730 THROMBOPLASTIN TIME PARTIAL: CPT | Performed by: EMERGENCY MEDICINE

## 2020-11-23 PROCEDURE — 36415 COLL VENOUS BLD VENIPUNCTURE: CPT | Performed by: HOSPITALIST

## 2020-11-23 PROCEDURE — XW033E5 INTRODUCTION OF REMDESIVIR ANTI-INFECTIVE INTO PERIPHERAL VEIN, PERCUTANEOUS APPROACH, NEW TECHNOLOGY GROUP 5: ICD-10-PCS | Performed by: HOSPITALIST

## 2020-11-23 PROCEDURE — 250N000011 HC RX IP 250 OP 636: Performed by: HOSPITALIST

## 2020-11-23 PROCEDURE — 85379 FIBRIN DEGRADATION QUANT: CPT | Performed by: HOSPITALIST

## 2020-11-23 PROCEDURE — 83615 LACTATE (LD) (LDH) ENZYME: CPT | Performed by: HOSPITALIST

## 2020-11-23 PROCEDURE — 99223 1ST HOSP IP/OBS HIGH 75: CPT | Mod: AI | Performed by: HOSPITALIST

## 2020-11-23 PROCEDURE — 250N000009 HC RX 250: Performed by: HOSPITALIST

## 2020-11-23 PROCEDURE — 93005 ELECTROCARDIOGRAM TRACING: CPT

## 2020-11-23 PROCEDURE — 96375 TX/PRO/DX INJ NEW DRUG ADDON: CPT

## 2020-11-23 PROCEDURE — 80053 COMPREHEN METABOLIC PANEL: CPT | Performed by: EMERGENCY MEDICINE

## 2020-11-23 PROCEDURE — 86140 C-REACTIVE PROTEIN: CPT | Performed by: HOSPITALIST

## 2020-11-23 PROCEDURE — 85025 COMPLETE CBC W/AUTO DIFF WBC: CPT | Performed by: EMERGENCY MEDICINE

## 2020-11-23 PROCEDURE — 250N000009 HC RX 250: Performed by: EMERGENCY MEDICINE

## 2020-11-23 PROCEDURE — 96372 THER/PROPH/DIAG INJ SC/IM: CPT

## 2020-11-23 PROCEDURE — 85379 FIBRIN DEGRADATION QUANT: CPT | Performed by: EMERGENCY MEDICINE

## 2020-11-23 PROCEDURE — 250N000013 HC RX MED GY IP 250 OP 250 PS 637: Performed by: HOSPITALIST

## 2020-11-23 PROCEDURE — 96361 HYDRATE IV INFUSION ADD-ON: CPT

## 2020-11-23 PROCEDURE — 99285 EMERGENCY DEPT VISIT HI MDM: CPT | Mod: 25

## 2020-11-23 PROCEDURE — 82550 ASSAY OF CK (CPK): CPT | Performed by: HOSPITALIST

## 2020-11-23 PROCEDURE — 96374 THER/PROPH/DIAG INJ IV PUSH: CPT | Mod: 59

## 2020-11-23 RX ORDER — NALOXONE HYDROCHLORIDE 0.4 MG/ML
0.4 INJECTION, SOLUTION INTRAMUSCULAR; INTRAVENOUS; SUBCUTANEOUS
Status: DISCONTINUED | OUTPATIENT
Start: 2020-11-23 | End: 2020-12-05 | Stop reason: HOSPADM

## 2020-11-23 RX ORDER — CODEINE PHOSPHATE AND GUAIFENESIN 10; 100 MG/5ML; MG/5ML
5 SOLUTION ORAL EVERY 4 HOURS PRN
Status: DISCONTINUED | OUTPATIENT
Start: 2020-11-23 | End: 2020-12-05 | Stop reason: HOSPADM

## 2020-11-23 RX ORDER — ALBUTEROL SULFATE 90 UG/1
2 AEROSOL, METERED RESPIRATORY (INHALATION) 4 TIMES DAILY
Status: DISCONTINUED | OUTPATIENT
Start: 2020-11-23 | End: 2020-12-01

## 2020-11-23 RX ORDER — DEXAMETHASONE SODIUM PHOSPHATE 10 MG/ML
10 INJECTION, SOLUTION INTRAMUSCULAR; INTRAVENOUS ONCE
Status: COMPLETED | OUTPATIENT
Start: 2020-11-23 | End: 2020-11-23

## 2020-11-23 RX ORDER — ACETAMINOPHEN 325 MG/1
325-650 TABLET ORAL EVERY 6 HOURS PRN
COMMUNITY

## 2020-11-23 RX ORDER — AMOXICILLIN 250 MG
1 CAPSULE ORAL 2 TIMES DAILY
Status: DISCONTINUED | OUTPATIENT
Start: 2020-11-23 | End: 2020-12-01

## 2020-11-23 RX ORDER — AMOXICILLIN 250 MG
2 CAPSULE ORAL 2 TIMES DAILY
Status: DISCONTINUED | OUTPATIENT
Start: 2020-11-23 | End: 2020-12-01

## 2020-11-23 RX ORDER — ALBUTEROL SULFATE 90 UG/1
2 AEROSOL, METERED RESPIRATORY (INHALATION) EVERY 4 HOURS PRN
Status: DISCONTINUED | OUTPATIENT
Start: 2020-11-23 | End: 2020-12-01

## 2020-11-23 RX ORDER — ONDANSETRON 2 MG/ML
4 INJECTION INTRAMUSCULAR; INTRAVENOUS EVERY 6 HOURS PRN
Status: DISCONTINUED | OUTPATIENT
Start: 2020-11-23 | End: 2020-12-05 | Stop reason: HOSPADM

## 2020-11-23 RX ORDER — ACETAMINOPHEN 650 MG/1
650 SUPPOSITORY RECTAL EVERY 4 HOURS PRN
Status: DISCONTINUED | OUTPATIENT
Start: 2020-11-23 | End: 2020-12-05 | Stop reason: HOSPADM

## 2020-11-23 RX ORDER — IOPAMIDOL 755 MG/ML
500 INJECTION, SOLUTION INTRAVASCULAR ONCE
Status: COMPLETED | OUTPATIENT
Start: 2020-11-23 | End: 2020-11-23

## 2020-11-23 RX ORDER — LIDOCAINE 40 MG/G
CREAM TOPICAL
Status: DISCONTINUED | OUTPATIENT
Start: 2020-11-23 | End: 2020-12-05 | Stop reason: HOSPADM

## 2020-11-23 RX ORDER — ACETAMINOPHEN 325 MG/1
650 TABLET ORAL EVERY 4 HOURS PRN
Status: DISCONTINUED | OUTPATIENT
Start: 2020-11-23 | End: 2020-12-05 | Stop reason: HOSPADM

## 2020-11-23 RX ORDER — ONDANSETRON 4 MG/1
4 TABLET, ORALLY DISINTEGRATING ORAL EVERY 6 HOURS PRN
Status: DISCONTINUED | OUTPATIENT
Start: 2020-11-23 | End: 2020-12-05 | Stop reason: HOSPADM

## 2020-11-23 RX ORDER — NALOXONE HYDROCHLORIDE 0.4 MG/ML
0.2 INJECTION, SOLUTION INTRAMUSCULAR; INTRAVENOUS; SUBCUTANEOUS
Status: DISCONTINUED | OUTPATIENT
Start: 2020-11-23 | End: 2020-12-05 | Stop reason: HOSPADM

## 2020-11-23 RX ORDER — PRAVASTATIN SODIUM 40 MG
40 TABLET ORAL AT BEDTIME
COMMUNITY

## 2020-11-23 RX ORDER — SODIUM CHLORIDE 9 MG/ML
INJECTION, SOLUTION INTRAVENOUS CONTINUOUS
Status: DISCONTINUED | OUTPATIENT
Start: 2020-11-23 | End: 2020-11-23

## 2020-11-23 RX ORDER — DEXAMETHASONE 2 MG/1
6 TABLET ORAL DAILY
Status: DISCONTINUED | OUTPATIENT
Start: 2020-11-24 | End: 2020-11-26

## 2020-11-23 RX ORDER — POLYETHYLENE GLYCOL 3350 17 G/17G
17 POWDER, FOR SOLUTION ORAL DAILY
Status: DISCONTINUED | OUTPATIENT
Start: 2020-11-23 | End: 2020-12-01

## 2020-11-23 RX ORDER — FAMOTIDINE 20 MG/1
20 TABLET, FILM COATED ORAL 2 TIMES DAILY
Status: DISCONTINUED | OUTPATIENT
Start: 2020-11-23 | End: 2020-12-02

## 2020-11-23 RX ORDER — OXYCODONE HYDROCHLORIDE 5 MG/1
5-10 TABLET ORAL
Status: DISCONTINUED | OUTPATIENT
Start: 2020-11-23 | End: 2020-12-05 | Stop reason: HOSPADM

## 2020-11-23 RX ADMIN — SODIUM CHLORIDE 1000 ML: 9 INJECTION, SOLUTION INTRAVENOUS at 13:34

## 2020-11-23 RX ADMIN — SODIUM CHLORIDE 75 ML: 9 INJECTION, SOLUTION INTRAVENOUS at 13:58

## 2020-11-23 RX ADMIN — GUAIFENESIN AND CODEINE PHOSPHATE 5 ML: 10; 100 LIQUID ORAL at 21:01

## 2020-11-23 RX ADMIN — DEXAMETHASONE SODIUM PHOSPHATE 10 MG: 10 INJECTION, SOLUTION INTRAMUSCULAR; INTRAVENOUS at 15:46

## 2020-11-23 RX ADMIN — REMDESIVIR 200 MG: 100 INJECTION, POWDER, LYOPHILIZED, FOR SOLUTION INTRAVENOUS at 20:49

## 2020-11-23 RX ADMIN — FAMOTIDINE 20 MG: 20 TABLET ORAL at 21:01

## 2020-11-23 RX ADMIN — IOPAMIDOL 54 ML: 755 INJECTION, SOLUTION INTRAVENOUS at 13:58

## 2020-11-23 RX ADMIN — ENOXAPARIN SODIUM 40 MG: 40 INJECTION SUBCUTANEOUS at 21:01

## 2020-11-23 ASSESSMENT — ENCOUNTER SYMPTOMS
FEVER: 1
CHEST TIGHTNESS: 1
FATIGUE: 1
SHORTNESS OF BREATH: 1

## 2020-11-23 ASSESSMENT — MIFFLIN-ST. JEOR: SCORE: 1120.21

## 2020-11-23 NOTE — ED TRIAGE NOTES
Placed on oxygen at 3 liters/min nc while awaiting bed availability. Son remains with her. EKG ordered.

## 2020-11-23 NOTE — ED PROVIDER NOTES
"  History     Chief Complaint:  Respiratory Distress, Chest Pain, and Suspected Covid    A  was used (Dean).      Day Russell is a 60 year old female who presents with shortness of breath. The patient has shortness of breath with movement, chest tightness, and is fatigued. Her symptoms improve with rest. She has had these symptoms for the past 3-4 days. She also has low back pain. She tested positive for COVID-19 4 days ago. She had a fever up to 102.0 yesterday and \"felt hot\" prior to being tested. She rates her pain as a 5/10. No abdominal pain. No urinary symptoms. She states that her shortness of breath is at rest but significantly worse with movement.    Allergies:  No Known Drug Allergies      Medications:    Celebrex  Depo-provera  Naproxen  Omeprazole    Past Medical History:    Esophageal reflux  Hyperlipidemia     Past Surgical History:    Carpal tunnel release    Family History:    History reviewed. No pertinent family history.      Social History:  Smoking status: Never  Alcohol use: Yes  Patient presents alone.  PCP: Keli Guzmán    Marital Status:        Review of Systems   Constitutional: Positive for fatigue and fever.   Respiratory: Positive for chest tightness and shortness of breath.    All other systems reviewed and are negative.      Physical Exam     Patient Vitals for the past 24 hrs:   BP Temp Temp src Pulse Resp SpO2 Weight   11/23/20 1330 123/76 -- -- 94 26 94 % --   11/23/20 1235 127/75 98.1  F (36.7  C) Temporal 102 (!) 56 90 % 63.5 kg (140 lb)      Physical Exam  Constitutional: Alert, increased work of breathing. SpO2 91% on 3 L NC  HENT:    Nose: Nose normal.    Mouth/Throat: Oropharynx is clear, mucous membranes are moist  Eyes: EOM are normal. Pupils are equal, round, and reactive to light.   CV: Regular rate and rhythm, no murmurs, rubs or gallops.  Resp: Normal respiratory effort. Coarse breath sounds throughout.  GI: Soft, non-distended. There is no " tenderness. No rebound or guarding.   MSK: Normal range of motion. No deformity.   Neurological:   A/Ox3  5/5 strength is symmetric to the upper and lower extremities;   Sensation intact to light touch throughout the upper and lower extremities;   Skin: Skin is warm and dry.     Emergency Department Course   ECG:  @ 1310  Indication: Shortness of breath   Vent. Rate 94 bpm. MN interval 150 ms. QRS duration 86 ms. QT/QTc 360/450 ms. P-R-T axis 2 -27 10.   Normal sinus rhythm. Moderate voltage criteria for LVH, may be normal variant. Borderline ECG.    Read @ 1324 by Dr. Cuevas.     Imaging:  CT Chest Pulmonary Embolism w/ Contrast  Pending    CT Chest Pulmonary Embolism w Contrast   Final Result   IMPRESSION:   1. No evidence for pulmonary embolism.   2. Diffuse patchy and confluent groundglass opacities in the lungs.   Findings would be most consistent with a multifocal pneumonia as can   be seen with COVID-19.      KETAN DAVID MD         Radiographic findings were communicated with the patient who voiced understanding of the findings.    Laboratory:  CBC: WBC 5.6, HGB 14.5,       Partial thromboplastin time: 36    Labs Ordered and Resulted from Time of ED Arrival Up to the Time of Departure from the ED   INR - Abnormal; Notable for the following components:       Result Value    INR 0.82 (*)     All other components within normal limits   COMPREHENSIVE METABOLIC PANEL - Abnormal; Notable for the following components:    Chloride 110 (*)     Calcium 8.2 (*)     Albumin 2.8 (*)     AST 75 (*)     All other components within normal limits   CBC WITH PLATELETS DIFFERENTIAL   PARTIAL THROMBOPLASTIN TIME   TROPONIN I   NT PROBNP INPATIENT   PERIPHERAL IV CATHETER      Interventions:  1334 0.9% Sodium Chloride BOLUS 1000 mLs IV    Medications   0.9% sodium chloride BOLUS (1,000 mLs Intravenous New Bag 11/23/20 1334)     Followed by   sodium chloride 0.9% infusion (has no administration in time range)    dexamethasone PF (DECADRON) injection 10 mg (has no administration in time range)   CT Saline Flush (75 mLs Intravenous Given 20 1358)   iopamidol (ISOVUE-370) solution 500 mL (54 mLs Intravenous Given 20 1358)        Emergency Department Course:  1314 Nursing notes and vitals reviewed. I performed an exam of the patient as documented above.     EKG was done, interpretation as above.    IV inserted. Medicine administered as documented above. Blood drawn. This was sent to the lab for further testing, results above.    The patient was sent for a CT while in the emergency department, findings above.     1521  I consulted with Dr. Carter of the hospitalist services. They are in agreement to accept the patient for admission.    Findings and plan explained to the Patient who consents to admission. Discussed the patient with Dr. Carter, who will admit the patient to an observation bed for further monitoring, evaluation, and treatment.       Impression & Plan    Covid-19  Day Russell was evaluated during a global COVID-19 pandemic, which necessitated consideration that the patient might be at risk for infection with the SARS-CoV-2 virus that causes COVID-19.   Applicable protocols for evaluation were followed during the patient's care.         Medical Decision Makin year old female who comes in with increased work of breathing, fevers, fatigue and known COVID positive. Exam as above. Labs and CT PE protocol obtained. White count normal. Electrolytes grossly within normal limits. CT shows diffuse pulmonary infiltrates concerning for COVID pneumonia. Patient is requiring increasing oxygen by nasal cannula. Discussed findings with Dr. Carter who will admit the patient to the hospital. Clinical suspicion for ACS, dissection, pulmonary embolism is low at this time. IV fluids, decadron given.      Critical Care time exclusive of procedures: 36 minutes    Diagnosis:    ICD-10-CM    1. 2019 novel  coronavirus disease (COVID-19)  U07.1    2. Hypoxia  R09.02        Disposition:  Admitted to Dr. Raul Ngo Disclosure:  I, Naima Rhodes, am serving as a scribe at 1:14 PM on 11/23/2020 to document services personally performed by Aman Cuevas DO based on my observations and the provider's statements to me.         Aman Cuevas DO  11/23/20 1525

## 2020-11-23 NOTE — ED TRIAGE NOTES
Patient with chills, sweats, fever, cough a week ago, tested positive for COVID on Friday through [facility] laboratories. Shortness of breath started Saturday and worse today. She speaks [place], son is interpreting in triage.

## 2020-11-23 NOTE — LETTER
[place] [address] [facility]  [address]  [place] [address]  [phone] December 5, 2020    RE:  Day Russell                                                                                                                                                       [address] [facility] [id number]            To whom it may concern:    Day Russell is under my professional care for    2019 novel coronavirus disease (COVID-19)  Hypoxia  Other constipation  Gastroesophageal reflux disease without esophagitis     Patient was hospitalized from 11/23/20 to 12/5/20 and will be unable to return back to work until 12/14/20 or cleared by her primary MD     Sincerely,         Eddie Greenberg MD

## 2020-11-23 NOTE — H&P
"[facility]    History and Physical  Hospitalist     Date of Admission:  11/23/2020  Date of Service (when I saw the patient): 11/23/20  Provider: José Miguel Carter MD      Chief Complaint   Fever and shortness of breath    History is obtained from the patient, electronic health record and emergency department physician    History of Present Illness   Day Russell is a 60 year old female who has a history of essential hypertension, hyperlipidemia and GERD.  She is non-English-speaking, interview is conducted with help of the family.  She presents with complaint of fever and shortness of breath to the emergency department.  She has been sick for several days (probably > one week) and has been diagnosed with COVID-19 infection 11/19.  In the emergency department she has been tachypneic and hypoxemic, with improvement of oxygen saturation after started on supplemental by nasal cannula that has been increased from 2 to 4 L while there.  Her past medical history significant for essential hypertension, hyperlipidemia and GERD, however it looks like her preventive care has not been very consistent.  Except for omeprazole, no medications are listed for blood pressure or lipid control.  She has otherwise not alarming findings in her CBC, chemistry, glycemia or EKG.  Apparently patient has been taking only Tylenol at home.  I have discussed her case with Dr. Cuevas the emergency department physician.  Vital signs:  Temp: 98.1  F (36.7  C) Temp src: Temporal BP: 123/76 Pulse: 94   Resp: 26 SpO2: 94 % O2 Device: Nasal cannula Oxygen Delivery: 3 LPM   Weight: 63.5 kg (140 lb)  Estimated body mass index is 31.16 kg/m  as calculated from the following:    Height as of 1/28/05: 1.448 m (4' 9\").    Weight as of 1/28/05: 65.3 kg (144 lb).  CT of the chest with PE protocol.  1. No evidence for pulmonary embolism.  2. Diffuse patchy and confluent groundglass opacities in the lungs.  Findings would be most " consistent with a multifocal pneumonia as can  be seen with COVID-19.    Past Medical History    I have reviewed this patient's medical history and updated it with pertinent information if needed.   Past Medical History:   Diagnosis Date     ESOPHAGEAL REFLUX     Gastroesophageal reflux     MIXED HYPERLIPIDEMIA     Hyperlipidemia       Assessment & Plan   Day Russell is a 60 year old female who is Covid 19 positive with several days of symptoms at home and presents with fever, tachypnea and hypoxemia.  CT of the chest with PE protocol ruled out pulmonary embolism but she has diffuse bilateral groundglass infiltrates.    1.  Acute hypoxemic respiratory failure in the setting of COVID-19 pneumonia.   -She is noticed moderate hypoxemia but still in the acute phase of disease, unpredictable course at this point.  2.  Acute Covid 19 viral pneumonia with bilateral groundglass infiltrates.  -It seems to be just viral pneumonia at this point.  Procalcitonin is still in process.  She will be started on Decadron and remdesivir per protocol.  In case of evidence of superimposed bacterial infection she should be treated with antibiotic as needed.  3.  Essential hypertension.  -Normotensive on admission.  Close follow-up, no medication prescribed.  4.  Hyperlipidemia.  -No medication listed in home meds.  Recheck after reconciliation.  5.  GERD.  -Takes omeprazole at home.    Plan.   Admit to observation.  COVID-19 isolation precaution.  Regular diet.  Continuous pulse oximetry.  O2 supplemental by NC to keep saturation 94% or higher.  MDI 2 puff every 4 hours as needed.  Decadron and remdesivir per protocol.  Tylenol for pain or fever.  Zofran for nausea or vomiting.  Lovenox for DVT prophylaxis.  Famotidine 1 tablet 20 mg twice daily.    Code Status   Full Code    Primary Care Physician   Keli Guzmán      Past Surgical History   I have reviewed this patient's surgical history and updated it with pertinent information if  needed.  Past Surgical History:   Procedure Laterality Date     NONSPECIFIC PROCEDURE      s/p  x 3       Prior to Admission Medications   Prior to Admission Medications   Prescriptions Last Dose Informant Patient Reported? Taking?   ADVIL 200 MG OR TABS   No No   Si TABLET EVERY 4 TO 6 HOURS AS NEEDED   CELEBREX 200 MG OR CAPS   No No   Si Capsule daily   DEPO-PROVERA 150 MG/ML IM SUSP   No No   Si ml every 12 weeks   DEPO-PROVERA 150 MG/ML IM SUSP   No No   Sig: given now   DEPO-PROVERA 150 MG/ML IM SUSP   Yes No   Si ml every 12 weeks   HYDROcodone-acetaminophen (NORCO) 5-325 MG per tablet   No No   Sig: Take 1-2 tablets by mouth every 4 hours as needed for moderate to severe pain   NAPROXEN 500 MG OR TABS   No No   Si TABLET TWICE DAILY   OMEPRAZOLE 40 MG OR CPDR   No No   Si CAPSULE DAILY in the morning   ORDER FOR DME   No No   Sig: lft and rt wrist braces      Facility-Administered Medications: None     Allergies   Allergies   Allergen Reactions     No Known Drug Allergies        Social History   I have personally reviewed the social history with the patient showing.  Social History     Tobacco Use     Smoking status: Never Smoker   Substance Use Topics     Alcohol use: Yes     Comment: social every few months     Family History   I have reviewed this patient's family history and it is not contributory to the admission .       Review of Systems   Except for positive as noted in the HPI, a 12-system Review of Systems was found to be negative.      Physical Exam   Vital Signs with Ranges  Temp:  [98.1  F (36.7  C)] 98.1  F (36.7  C)  Pulse:  [] 94  Resp:  [26-56] 26  BP: (123-127)/(75-76) 123/76  SpO2:  [90 %-94 %] 94 %  140 lbs 0 oz    GEN:  Alert, oriented x 3, appears comfortable, NAD.  HEENT:  Normocephalic/atraumatic, no scleral icterus, no nasal discharge, mouth moist.  CV:  Regular rate and rhythm, no murmur or JVD.  S1 + S2 noted, no S3 or S4.  LUNGS:  Clear to  auscultation bilaterally without rales/rhonchi/wheezing/retractions.  Symmetric chest rise on inhalation noted.  ABD:  Active bowel sounds, soft, non-tender/non-distended.  No rebound/guarding/rigidity.  EXT:  No edema or cyanosis.  No joint synovitis noted.  SKIN:  Dry to touch, no exanthems noted in the visualized areas.       Data   I personally reviewed the EKG tracing showing Normal sinus rhythm..  Results for orders placed or performed during the hospital encounter of 11/23/20 (from the past 24 hour(s))   EKG 12 lead   Result Value Ref Range    Interpretation ECG Click View Image link to view waveform and result    CBC with platelets differential   Result Value Ref Range    WBC 5.6 4.0 - 11.0 10e9/L    RBC Count 4.54 3.8 - 5.2 10e12/L    Hemoglobin 14.5 11.7 - 15.7 g/dL    Hematocrit 44.3 35.0 - 47.0 %    MCV 98 78 - 100 fl    MCH 31.9 26.5 - 33.0 pg    MCHC 32.7 31.5 - 36.5 g/dL    RDW 12.4 10.0 - 15.0 %    Platelet Count 212 150 - 450 10e9/L    Diff Method Automated Method     % Neutrophils 73.6 %    % Lymphocytes 18.3 %    % Monocytes 7.3 %    % Eosinophils 0.2 %    % Basophils 0.2 %    % Immature Granulocytes 0.4 %    Nucleated RBCs 0 0 /100    Absolute Neutrophil 4.1 1.6 - 8.3 10e9/L    Absolute Lymphocytes 1.0 0.8 - 5.3 10e9/L    Absolute Monocytes 0.4 0.0 - 1.3 10e9/L    Absolute Eosinophils 0.0 0.0 - 0.7 10e9/L    Absolute Basophils 0.0 0.0 - 0.2 10e9/L    Abs Immature Granulocytes 0.0 0 - 0.4 10e9/L    Absolute Nucleated RBC 0.0    INR   Result Value Ref Range    INR 0.82 (L) 0.86 - 1.14   Partial thromboplastin time   Result Value Ref Range    PTT 36 22 - 37 sec   Comprehensive metabolic panel   Result Value Ref Range    Sodium 139 133 - 144 mmol/L    Potassium 3.4 3.4 - 5.3 mmol/L    Chloride 110 (H) 94 - 109 mmol/L    Carbon Dioxide 23 20 - 32 mmol/L    Anion Gap 6 3 - 14 mmol/L    Glucose 94 70 - 99 mg/dL    Urea Nitrogen 9 7 - 30 mg/dL    Creatinine 0.65 0.52 - 1.04 mg/dL    GFR Estimate >90 >60  mL/min/[1.73_m2]    GFR Estimate If Black >90 >60 mL/min/[1.73_m2]    Calcium 8.2 (L) 8.5 - 10.1 mg/dL    Bilirubin Total 0.2 0.2 - 1.3 mg/dL    Albumin 2.8 (L) 3.4 - 5.0 g/dL    Protein Total 7.7 6.8 - 8.8 g/dL    Alkaline Phosphatase 73 40 - 150 U/L    ALT 50 0 - 50 U/L    AST 75 (H) 0 - 45 U/L   Troponin I   Result Value Ref Range    Troponin I ES <0.015 0.000 - 0.045 ug/L   Nt probnp inpatient (BNP)   Result Value Ref Range    N-Terminal Pro BNP Inpatient 39 0 - 900 pg/mL   D dimer quantitative   Result Value Ref Range    D Dimer 0.7 (H) 0.0 - 0.50 ug/ml FEU   CT Chest Pulmonary Embolism w Contrast    Narrative    CT CHEST PULMONARY EMBOLISM WITH CONTRAST   11/23/2020 2:04 PM     HISTORY: Shortness of breath, fatigue for the past 3-4 days. Low back  pain.    TECHNIQUE:  CT of the chest was performed following the administration  of 54 mL Isovue-370. Radiation dose for this scan was reduced using  automated exposure control, adjustment of the mA and/or kV according  to patient size, or iterative reconstruction technique.    COMPARISON: None.    FINDINGS:  Images are mildly degraded by motion artifact. No definite  pulmonary embolism identified.    There are diffuse patchy and confluent groundglass opacities scattered  throughout the lungs. These are most predominant in the lower lobes.  Mildly prominent paratracheal lymph nodes.      Impression    IMPRESSION:  1. No evidence for pulmonary embolism.  2. Diffuse patchy and confluent groundglass opacities in the lungs.  Findings would be most consistent with a multifocal pneumonia as can  be seen with COVID-19.    KETAN DAVID MD       Disclaimer: This note consists of symbols derived from keyboarding, dictation and/or voice recognition software. As a result, there may be errors in the script that have gone undetected. Please consider this when interpreting information found in this chart.

## 2020-11-23 NOTE — PHARMACY-ADMISSION MEDICATION HISTORY
Admission medication history interview status for this patient is complete. See [facility] admission navigator for allergy information, prior to admission medications and immunization status.     Medication history interview done via telephone during Covid-19 pandemic, indicate source(s): Patient via Laotian   Medication history resources (including written lists, pill bottles, clinic record):None      Changes made to PTA medication list:  Added: APAP, cough OTC, and pravachol  Deleted: advil, Celebrex, Depo- Provera, Norco, Naproxen, prilosec  Changed: none    Actions taken by pharmacist (provider contacted, etc):None     Additional medication history information: Info is only somewhat reliable per patient.    Medication reconciliation/reorder completed by provider prior to medication history?  n   (Y/N)         Prior to Admission medications    Medication Sig Last Dose Taking? Auth Provider   acetaminophen (TYLENOL) 325 MG tablet Take 325-650 mg by mouth every 6 hours as needed for mild pain  Yes Unknown, Entered By History   pravastatin (PRAVACHOL) 40 MG tablet Take 40 mg by mouth At Bedtime 11/22/2020 at Unknown time Yes Unknown, Entered By History   UNKNOWN TO PATIENT Take 1 tablet by mouth At Bedtime Cough Medciation OTC--name unknown per patient  Yes Unknown, Entered By History

## 2020-11-23 NOTE — ED NOTES
[facility]  ED Nurse Handoff Report    Day Russell is a 60 year old female   ED Chief complaint: Respiratory Distress, Chest Pain, and Suspected Covid  . ED Diagnosis:   Final diagnoses:   2019 novel coronavirus disease (COVID-19)   Hypoxia     Allergies:   Allergies   Allergen Reactions     No Known Drug Allergies        Code Status: Full Code  Activity level - Baseline/Home:  Independent. Activity Level - Current:   Stand by Assist. Lift room needed: No. Bariatric: No   Needed: Yes - Mauritanian  Isolation: Yes. Infection: Not Applicable  COVID r/o and special precautions.     Vital Signs:   Vitals:    11/23/20 1235 11/23/20 1330   BP: 127/75 123/76   Pulse: 102 94   Resp: (!) 56 26   Temp: 98.1  F (36.7  C)    TempSrc: Temporal    SpO2: 90% 94%   Weight: 63.5 kg (140 lb)        Cardiac Rhythm:  ,      Pain level:    Patient confused: No. Patient Falls Risk: Yes.   Elimination Status: Has voided   Patient Report - Initial Complaint: Respiratory distress. Focused Assessment: Respiratory - Respiratory WDL: all (Pt tested positive for covid on thursday. Since she has had pain in her chest and SOB. Pt says it has gotten much worse today. Pt also feeling weak. ) Rhythm/Pattern, Respiratory: shortness of breath  Breath Sounds: All Fields  Cough Frequency: infrequent    Tests Performed: labs, chest CT. Abnormal Results:   Labs Ordered and Resulted from Time of ED Arrival Up to the Time of Departure from the ED   INR - Abnormal; Notable for the following components:       Result Value    INR 0.82 (*)     All other components within normal limits   COMPREHENSIVE METABOLIC PANEL - Abnormal; Notable for the following components:    Chloride 110 (*)     Calcium 8.2 (*)     Albumin 2.8 (*)     AST 75 (*)     All other components within normal limits   CBC WITH PLATELETS DIFFERENTIAL   PARTIAL THROMBOPLASTIN TIME   TROPONIN I   NT PROBNP INPATIENT   PERIPHERAL IV CATHETER     CT Chest Pulmonary Embolism w  Contrast   Preliminary Result   IMPRESSION:   1. No evidence for pulmonary embolism.   2. Diffuse patchy and confluent groundglass opacities in the lungs.   Findings would be most consistent with a multifocal pneumonia as can   be seen with COVID-19.        .   Treatments provided: see MAR  Family Comments: N/A  OBS brochure/video discussed/provided to patient:  No  ED Medications:   Medications   0.9% sodium chloride BOLUS (1,000 mLs Intravenous New Bag 11/23/20 1334)     Followed by   sodium chloride 0.9% infusion (has no administration in time range)   CT Saline Flush (75 mLs Intravenous Given 11/23/20 1358)   iopamidol (ISOVUE-370) solution 500 mL (54 mLs Intravenous Given 11/23/20 1358)     Drips infusing:  No  For the majority of the shift, the patient's behavior Green. Interventions performed were N/A.    Sepsis treatment initiated: No     Patient tested for COVID 19 prior to admission: NO - tested positive on Thursday at [facility]    ED Nurse Name/Phone Number: Anne Soriano RN,   2:34 PM    RECEIVING UNIT ED HANDOFF REVIEW    Above ED Nurse Handoff Report was reviewed: Yes  Reviewed by: Anne Villalobos RN on November 23, 2020 at 5:27 PM

## 2020-11-24 LAB
ALBUMIN SERPL-MCNC: 2.5 G/DL (ref 3.4–5)
ALP SERPL-CCNC: 73 U/L (ref 40–150)
ALT SERPL W P-5'-P-CCNC: 51 U/L (ref 0–50)
ANION GAP SERPL CALCULATED.3IONS-SCNC: 6 MMOL/L (ref 3–14)
AST SERPL W P-5'-P-CCNC: 70 U/L (ref 0–45)
AT III ACT/NOR PPP CHRO: 117 % (ref 85–135)
BASOPHILS # BLD AUTO: 0 10E9/L (ref 0–0.2)
BASOPHILS NFR BLD AUTO: 0 %
BILIRUB SERPL-MCNC: 0.4 MG/DL (ref 0.2–1.3)
BUN SERPL-MCNC: 9 MG/DL (ref 7–30)
CALCIUM SERPL-MCNC: 7.9 MG/DL (ref 8.5–10.1)
CHLORIDE SERPL-SCNC: 113 MMOL/L (ref 94–109)
CO2 SERPL-SCNC: 24 MMOL/L (ref 20–32)
CREAT SERPL-MCNC: 0.44 MG/DL (ref 0.52–1.04)
CRP SERPL-MCNC: 125 MG/L (ref 0–8)
D DIMER PPP FEU-MCNC: 0.6 UG/ML FEU (ref 0–0.5)
DIFFERENTIAL METHOD BLD: NORMAL
EOSINOPHIL # BLD AUTO: 0 10E9/L (ref 0–0.7)
EOSINOPHIL NFR BLD AUTO: 0 %
ERYTHROCYTE [DISTWIDTH] IN BLOOD BY AUTOMATED COUNT: 12.8 % (ref 10–15)
FIBRINOGEN PPP-MCNC: 755 MG/DL (ref 200–420)
GFR SERPL CREATININE-BSD FRML MDRD: >90 ML/MIN/{1.73_M2}
GLUCOSE BLDC GLUCOMTR-MCNC: 106 MG/DL (ref 70–99)
GLUCOSE BLDC GLUCOMTR-MCNC: 145 MG/DL (ref 70–99)
GLUCOSE BLDC GLUCOMTR-MCNC: 154 MG/DL (ref 70–99)
GLUCOSE BLDC GLUCOMTR-MCNC: 156 MG/DL (ref 70–99)
GLUCOSE BLDC GLUCOMTR-MCNC: 173 MG/DL (ref 70–99)
GLUCOSE SERPL-MCNC: 132 MG/DL (ref 70–99)
HCT VFR BLD AUTO: 41.8 % (ref 35–47)
HGB BLD-MCNC: 13.5 G/DL (ref 11.7–15.7)
IL6 SERPL-MCNC: 68.67 PG/ML
IMM GRANULOCYTES # BLD: 0 10E9/L (ref 0–0.4)
IMM GRANULOCYTES NFR BLD: 0.7 %
INR PPP: 0.95 (ref 0.86–1.14)
LDH SERPL L TO P-CCNC: 512 U/L (ref 81–234)
LYMPHOCYTES # BLD AUTO: 0.9 10E9/L (ref 0.8–5.3)
LYMPHOCYTES NFR BLD AUTO: 21.2 %
MCH RBC QN AUTO: 31.8 PG (ref 26.5–33)
MCHC RBC AUTO-ENTMCNC: 32.3 G/DL (ref 31.5–36.5)
MCV RBC AUTO: 99 FL (ref 78–100)
MONOCYTES # BLD AUTO: 0.4 10E9/L (ref 0–1.3)
MONOCYTES NFR BLD AUTO: 9.5 %
NEUTROPHILS # BLD AUTO: 2.8 10E9/L (ref 1.6–8.3)
NEUTROPHILS NFR BLD AUTO: 68.6 %
NRBC # BLD AUTO: 0 10*3/UL
NRBC BLD AUTO-RTO: 0 /100
PLATELET # BLD AUTO: 215 10E9/L (ref 150–450)
PLATELET # BLD EST: NORMAL 10*3/UL
POTASSIUM SERPL-SCNC: 4.2 MMOL/L (ref 3.4–5.3)
PROT SERPL-MCNC: 7.2 G/DL (ref 6.8–8.8)
RBC # BLD AUTO: 4.24 10E12/L (ref 3.8–5.2)
RBC MORPH BLD: NORMAL
RETICS # AUTO: 23.3 10E9/L (ref 25–95)
RETICS/RBC NFR AUTO: 0.6 % (ref 0.5–2)
SODIUM SERPL-SCNC: 143 MMOL/L (ref 133–144)
TROPONIN I SERPL-MCNC: <0.015 UG/L (ref 0–0.04)
VARIANT LYMPHS BLD QL SMEAR: PRESENT
WBC # BLD AUTO: 4.1 10E9/L (ref 4–11)

## 2020-11-24 PROCEDURE — 250N000011 HC RX IP 250 OP 636: Performed by: HOSPITALIST

## 2020-11-24 PROCEDURE — G0378 HOSPITAL OBSERVATION PER HR: HCPCS

## 2020-11-24 PROCEDURE — 83615 LACTATE (LD) (LDH) ENZYME: CPT | Performed by: HOSPITALIST

## 2020-11-24 PROCEDURE — 999N000185 HC STATISTIC TRANSPORT TIME EA 15 MIN

## 2020-11-24 PROCEDURE — 999N000157 HC STATISTIC RCP TIME EA 10 MIN

## 2020-11-24 PROCEDURE — 250N000012 HC RX MED GY IP 250 OP 636 PS 637: Performed by: INTERNAL MEDICINE

## 2020-11-24 PROCEDURE — 250N000012 HC RX MED GY IP 250 OP 636 PS 637: Performed by: HOSPITALIST

## 2020-11-24 PROCEDURE — 36415 COLL VENOUS BLD VENIPUNCTURE: CPT | Performed by: HOSPITALIST

## 2020-11-24 PROCEDURE — 258N000003 HC RX IP 258 OP 636: Performed by: HOSPITALIST

## 2020-11-24 PROCEDURE — 120N000001 HC R&B MED SURG/OB

## 2020-11-24 PROCEDURE — 250N000013 HC RX MED GY IP 250 OP 250 PS 637: Performed by: HOSPITALIST

## 2020-11-24 PROCEDURE — 85045 AUTOMATED RETICULOCYTE COUNT: CPT | Performed by: HOSPITALIST

## 2020-11-24 PROCEDURE — 999N000215 HC STATISTIC HFNC ADULT NON-CPAP

## 2020-11-24 PROCEDURE — 200N000001 HC R&B ICU

## 2020-11-24 PROCEDURE — 96372 THER/PROPH/DIAG INJ SC/IM: CPT | Performed by: HOSPITALIST

## 2020-11-24 PROCEDURE — 99233 SBSQ HOSP IP/OBS HIGH 50: CPT | Performed by: INTERNAL MEDICINE

## 2020-11-24 PROCEDURE — 80053 COMPREHEN METABOLIC PANEL: CPT | Performed by: HOSPITALIST

## 2020-11-24 PROCEDURE — 96376 TX/PRO/DX INJ SAME DRUG ADON: CPT

## 2020-11-24 PROCEDURE — 85610 PROTHROMBIN TIME: CPT | Performed by: HOSPITALIST

## 2020-11-24 PROCEDURE — 85379 FIBRIN DEGRADATION QUANT: CPT | Performed by: HOSPITALIST

## 2020-11-24 PROCEDURE — 85025 COMPLETE CBC W/AUTO DIFF WBC: CPT | Performed by: HOSPITALIST

## 2020-11-24 PROCEDURE — 84484 ASSAY OF TROPONIN QUANT: CPT | Performed by: HOSPITALIST

## 2020-11-24 PROCEDURE — 250N000009 HC RX 250: Performed by: HOSPITALIST

## 2020-11-24 PROCEDURE — 999N001017 HC STATISTIC GLUCOSE BY METER IP

## 2020-11-24 PROCEDURE — 85384 FIBRINOGEN ACTIVITY: CPT | Performed by: HOSPITALIST

## 2020-11-24 PROCEDURE — 86140 C-REACTIVE PROTEIN: CPT | Performed by: HOSPITALIST

## 2020-11-24 RX ORDER — NICOTINE POLACRILEX 4 MG
15-30 LOZENGE BUCCAL
Status: DISCONTINUED | OUTPATIENT
Start: 2020-11-24 | End: 2020-12-05 | Stop reason: HOSPADM

## 2020-11-24 RX ORDER — DEXTROSE MONOHYDRATE 25 G/50ML
25-50 INJECTION, SOLUTION INTRAVENOUS
Status: DISCONTINUED | OUTPATIENT
Start: 2020-11-24 | End: 2020-12-05 | Stop reason: HOSPADM

## 2020-11-24 RX ORDER — DEXTROSE MONOHYDRATE 25 G/50ML
25-50 INJECTION, SOLUTION INTRAVENOUS
Status: DISCONTINUED | OUTPATIENT
Start: 2020-11-24 | End: 2020-11-25

## 2020-11-24 RX ORDER — NICOTINE POLACRILEX 4 MG
15-30 LOZENGE BUCCAL
Status: DISCONTINUED | OUTPATIENT
Start: 2020-11-24 | End: 2020-11-25

## 2020-11-24 RX ADMIN — DOCUSATE SODIUM AND SENNOSIDES 1 TABLET: 8.6; 5 TABLET ORAL at 09:23

## 2020-11-24 RX ADMIN — ENOXAPARIN SODIUM 40 MG: 40 INJECTION SUBCUTANEOUS at 19:36

## 2020-11-24 RX ADMIN — REMDESIVIR 100 MG: 100 INJECTION, POWDER, LYOPHILIZED, FOR SOLUTION INTRAVENOUS at 19:56

## 2020-11-24 RX ADMIN — FAMOTIDINE 20 MG: 20 TABLET ORAL at 19:36

## 2020-11-24 RX ADMIN — INSULIN ASPART 1 UNITS: 100 INJECTION, SOLUTION INTRAVENOUS; SUBCUTANEOUS at 14:47

## 2020-11-24 RX ADMIN — DOCUSATE SODIUM AND SENNOSIDES 1 TABLET: 8.6; 5 TABLET ORAL at 19:36

## 2020-11-24 RX ADMIN — ALBUTEROL SULFATE 2 PUFF: 90 AEROSOL, METERED RESPIRATORY (INHALATION) at 10:12

## 2020-11-24 RX ADMIN — INSULIN ASPART 1 UNITS: 100 INJECTION, SOLUTION INTRAVENOUS; SUBCUTANEOUS at 16:00

## 2020-11-24 RX ADMIN — FAMOTIDINE 20 MG: 20 TABLET ORAL at 09:23

## 2020-11-24 RX ADMIN — POLYETHYLENE GLYCOL 3350 17 G: 17 POWDER, FOR SOLUTION ORAL at 09:23

## 2020-11-24 RX ADMIN — DEXAMETHASONE 6 MG: 2 TABLET ORAL at 09:23

## 2020-11-24 RX ADMIN — ENOXAPARIN SODIUM 40 MG: 40 INJECTION SUBCUTANEOUS at 09:23

## 2020-11-24 ASSESSMENT — ACTIVITIES OF DAILY LIVING (ADL)
ADLS_ACUITY_SCORE: 18
ADLS_ACUITY_SCORE: 18

## 2020-11-24 ASSESSMENT — MIFFLIN-ST. JEOR: SCORE: 1109.78

## 2020-11-24 NOTE — PLAN OF CARE
PRIMARY DIAGNOSIS: HYPOXIA, COVID 19  OUTPATIENT/OBSERVATION GOALS TO BE MET BEFORE DISCHARGE:  1. Dyspnea improved and O2 sats >88% on RA or back to baseline O2 levels: No   SpO2: 94 %, O2 Device: 4L O2 per Nasal cannula    2. Tolerating oral abx or appropriate plans made outpatient infusion:  Pt on IV Redesivir.    3. Vitals signs normal or return to baseline: No, pt still requiring O2     4. Short term supplemental O2 needed with activity at home: No    5. Tolerate oral intake to maintain hydration: Yes    6. Return to near baseline physical activity: No    Discharge Planner Nurse   Safe discharge environment identified: Yes  Barriers to discharge: Yes       Entered by: Karmen Tejada 11/24/2020 4:53 AM     Vitals are Temp: 96  F (35.6  C) Temp src: Oral BP: (!) 140/88 Pulse: 86   Resp: (!) 32 SpO2: 92 %.  Patient is Alert and Oriented x4. They are SBA with Gait Belt.  Pt is on special precaution for Covid 19.  Pt is sob with exertion, desat to mid 80's with ambulation. Pt breathing is shallow and rapid. Pt needs verbal cue provided to slow breathing down and sats able to come back into mid 90's. Pt is a Regular diet.  They are denying pain.  Educated pt to use call light when need to use bathroom and sob. Patient is Saline locked. Bed alarms on. Will continue to monitor.     Please review provider order for any additional goals.   Nurse to notify provider when observation goals have been met and patient is ready for discharge.

## 2020-11-24 NOTE — PLAN OF CARE
"PRIMARY DIAGNOSIS: HYPOXIA, COVID 19  OUTPATIENT/OBSERVATION GOALS TO BE MET BEFORE DISCHARGE:  1. Dyspnea improved and O2 sats >88% on RA or back to baseline O2 levels: No   High flow O2- 40L, 80%FiO2 - sats 94%    2. Tolerating oral abx or appropriate plans made outpatient infusion: No abx therapy started at this time- remdisivir started    3. Vitals signs normal or return to baseline: No, pt still requiring O2 - high flow    4. Short term supplemental O2 needed with activity at home: Unsure at this time    5. Tolerate oral intake to maintain hydration: Yes    6. Return to near baseline physical activity: No- A1    Discharge Planner Nurse   Safe discharge environment identified: Yes  Barriers to discharge: Yes       Entered by: Leisa Mayer 11/24/2020      AOx4. Difficult to fully assess d/t cassandra speaking needing  and difficult to hear with hepa filter. LS diminished. On high flow O2-40L FiO2- 80%. Slow deep breathing encouraged. O2 sats 94%.  RR- 24-28. Tolerated diet this am. Tele placed- SR HR 90's.  Afebrile.  Transfering to higher level of care.  Report given to receiving nurse. Rene update family.  /74 (BP Location: Right arm)   Pulse 91   Temp 98.6  F (37  C) (Oral)   Resp 24   Ht 1.448 m (4' 9\")   Wt 65.6 kg (144 lb 9.6 oz)   SpO2 94%   BMI 31.29 kg/m    Please review provider order for any additional goals.   Nurse to notify provider when observation goals have been met and patient is ready for discharge.  "

## 2020-11-24 NOTE — PROGRESS NOTES
Notified by RN because patient is requiring oxygen 15 L via oxygen mask with saturation at 90 to 91%.  She is 60-year-old female admitted for acute hypoxic respiratory failure in the setting of Covid pneumonia.  We will start her on HFNC. No bed on med/surg floor. Will transfer patient to ICU for further management.

## 2020-11-24 NOTE — PROGRESS NOTES
"Pt satting 88-89% on 4-5L O2 per NC after ambulation to bathroom. Switched to oxymask with 5L O2, sats 94-96%. Within 10 minutes, pt continued to desat into high 80's. Increase to 15L per oxymask and turned pt on side, O2 sats continue to be in high 88-91%. Pt breathing shallow and work of breathing increased. Pt unable to deep breathe and stated, \"I'm tired\". Paged X cover and RT. RT placed pt on high flow nasal canal 40 LPM with FiO2 80%, sats 93-95%. Pt appeared more comfortable and work of breathing reduced. Pt monitored on Ipad.     RT stated that when pt transfers off the unit, page RT to move high flow equipment to transfer destination prior to transfer. Place oxymask on pt and put on 10-15L O2 during transfer. Verbal report given to morning RN. Will continue to monitor and provide cares.   "

## 2020-11-24 NOTE — PLAN OF CARE
"PRIMARY DIAGNOSIS: Hypoxia- covid +  OUTPATIENT/OBSERVATION GOALS TO BE MET BEFORE DISCHARGE:  1. Dyspnea improved and O2 sats >88% on RA or back to baseline O2 levels: No   SpO2: 96 %, O2 Device: 5L Nasal cannula    2. Tolerating oral abx or appropriate plans made outpatient infusion:  no antibiotics at this time     3. Vitals signs normal or return to baseline: No    4. Short term supplemental O2 needed with activity at home:  as of right now, yes    5. Tolerate oral intake to maintain hydration: Yes    6. Return to near baseline physical activity: No    Discharge Planner Nurse   Safe discharge environment identified: Yes  Barriers to discharge: Yes       Entered by: Anne Villalobos 11/23/2020 10:24 PM     Please review provider order for any additional goals.   Nurse to notify provider when observation goals have been met and patient is ready for discharge.  Sating in mid 90s on 5L of O2- pt prefers nasal cannula pt more anxious with oxymask on, respirations in the 40s, afebrile. A&O. Up SBA with gait belt. Unable to take deep breaths as it triggers her cough, shallow breathing, jansen, sob at rest. Denies pain. Regular diet, little appetite. Pt states \"I'm just very very tired.\" Plan- continuous pulse ox-maintain oxygen sats so 94% or higher, provider notified about respirations and cough- prn robitussin ordered, remdesivir every 24 hours, decadron daily.      "

## 2020-11-24 NOTE — PROGRESS NOTES
Patient placed on HFNC @ 40LPM and 80% for PEEP therapy. RR 28-30, BS diminished and sating 94%. Pt is tolerating it well. Will continue to monitor and assess the pt's current respiratory status and needs.

## 2020-11-24 NOTE — PROGRESS NOTES
[facility]    Medicine Progress Note - Hospitalist Service       Date of Admission:  11/23/2020  Length of stay: 0 days    Assessment & Plan   Day Russell is a 60-year-old female with a history of HTN, hyperlipidemia and GERD who is admitted to the hospitalist service with pneumonia and hypoxic respiratory failure due to COVID-19 infection.    Patient developed symptoms greater than 1 week prior to admission.  She had a positive COVID-19 test on 11/19.  She presented to the ED with increasing shortness of breath.  She was requiring oxygen to maintain sats.  CT PE showed no PE but did show diffuse patchy GGO consistent with COVID-19.  She was started on Decadron and remdesivir and admitted to the floor.    Since admission, patient's work of breathing increased and her O2 dropped, needing high flow nasal cannula to maintain sats.  She was then transferred to the ICU on 11/24 in the AM.  We will keep patient on high flow nasal cannula today, monitor respiratory status, keep in ICU setting.    COVID-19 pneumonia  Acute hypoxic respiratory failure secondary to above  - Continue Decadron 6 mg daily, 10-day course started on 11/23.  - Continue remdesivir for planned 5-day course, started on 11/23.  - No indication for antibiotics  - Continue enoxaparin 40 mg twice daily for DVT prophylaxis.  - Currently requiring high flow nasal cannula, 75% FiO2 at 40 L/min, necessitating ICU monitoring.    Chest pain  - Likely due due to the pneumonia as above.  CT PE protocol negative on admission.  Blood negative.  - Treat symptomatically.    Elevated transaminases  - ALT/ALT 51 and 70 respectively.  Likely due to Covid infection.    Hyperglycemia  - Due to steroids, sliding scale insulin as needed.  No diagnosis of diabetes.    Diet: Regular  DVT Prophylaxis: Enoxaparin (Lovenox) SQ  Malnutrition: No  Ramirez Catheter: Not present  Code Status: Full Code     Disposition Plan   Expected discharge:   Anticipate greater  "than 1 week in the hospital due to significant oxygen needs.    Froilan Doss MD  Hospitalist Service  [facility]  ______________________________________________________________________    Interval History   Transferred to the ICU this morning due to increased work of breathing and need for high flow nasal cannula.  She is sitting up in bed, indicating that she has some chest pain when she takes deep breath or coughs.  Says she has been able to eat.  Otherwise no new issues.    Patient was interviewed with Iraqi  over the phone.    Data reviewed today: I reviewed all medications, new labs and imaging results over the last 24 hours.     Physical Exam   /80   Pulse 95   Temp 98  F (36.7  C) (Oral)   Resp (!) 40   Ht 1.448 m (4' 9\")   Wt 65.6 kg (144 lb 9.6 oz)   SpO2 96%   BMI 31.29 kg/m    144 lbs 9.6 oz       General: Moderately ill-appearing, wearing high flow nasal cannula.    HEENT: No scleral icterus. Oropharynx moist.     Neck: Supple. Normal range of motion.     Pulmonary: Increased work of breathing, coarse anteriorly bilaterally    Cardiovascular: Regular rate and rhythm without murmur or extra heart sounds.    Abdomen: Soft and non-tender.    Extremities: No peripheral edema. No clubbing or cyanosis.     Neurologic: Awake, alert, appropriate.    Skin: Warm and dry.    Psychiatric: Normal affect and mood.     Data    Recent Labs   Lab 11/24/20  0508 11/23/20  1331   WBC 4.1 5.6   HGB 13.5 14.5   MCV 99 98    212   INR 0.95 0.82*    139   POTASSIUM 4.2 3.4   CHLORIDE 113* 110*   CO2 24 23   BUN 9 9   CR 0.44* 0.65   ANIONGAP 6 6   LEELA 7.9* 8.2*   * 94   ALBUMIN 2.5* 2.8*   PROTTOTAL 7.2 7.7   BILITOTAL 0.4 0.2   ALKPHOS 73 73   ALT 51* 50   AST 70* 75*   TROPI <0.015 <0.015     Recent Results (from the past 24 hour(s))   CT Chest Pulmonary Embolism w Contrast    Narrative    CT CHEST PULMONARY EMBOLISM WITH CONTRAST   11/23/2020 2:04 PM "     HISTORY: Shortness of breath, fatigue for the past 3-4 days. Low back  pain.    TECHNIQUE:  CT of the chest was performed following the administration  of 54 mL Isovue-370. Radiation dose for this scan was reduced using  automated exposure control, adjustment of the mA and/or kV according  to patient size, or iterative reconstruction technique.    COMPARISON: None.    FINDINGS:  Images are mildly degraded by motion artifact. No definite  pulmonary embolism identified.    There are diffuse patchy and confluent groundglass opacities scattered  throughout the lungs. These are most predominant in the lower lobes.  Mildly prominent paratracheal lymph nodes.      Impression    IMPRESSION:  1. No evidence for pulmonary embolism.  2. Diffuse patchy and confluent groundglass opacities in the lungs.  Findings would be most consistent with a multifocal pneumonia as can  be seen with COVID-19.    KETAN DAVID MD       Medications      Current Facility-Administered Medications   Medication Dose Route Frequency     albuterol  2 puff Inhalation 4x Daily     dexamethasone  6 mg Oral Daily     enoxaparin ANTICOAGULANT  40 mg Subcutaneous BID     famotidine  20 mg Oral BID     insulin aspart  1-7 Units Subcutaneous TID AC     insulin aspart  1-5 Units Subcutaneous At Bedtime     polyethylene glycol  17 g Oral Daily     remdesivir  100 mg Intravenous Q24H     senna-docusate  1 tablet Oral BID    Or     senna-docusate  2 tablet Oral BID     sodium chloride (PF)  3 mL Intracatheter Q8H

## 2020-11-24 NOTE — PROGRESS NOTES
6:14 AM  Provider notified: X cover  Reason: pt on 15L O2 per oxymask satting at 90-91%. Breathing shallow. RR 39.  Order:

## 2020-11-24 NOTE — UTILIZATION REVIEW
Admission Status; Secondary Review Determination         Under the authority of the Utilization Management Committee, the utilization review process indicated a secondary review on the above patient.  The review outcome is based on review of the medical records, discussions with staff, and applying clinical experience noted on the date of the review.       RATIONALE FOR DETERMINATION   The patient is a 60-year-old female who was admitted on 11/23/2020 with pneumonia and hypoxia due to COVID-19 infection.  Patient's chest x-ray was consistent with multifocal pneumonia groundglass appearance.  Patient is diabetic hypertension.   -  She has required increasing amounts of oxygen and is currently at high flow 40 L/min and satting as low as 88%..  Based on complexity and deteriorating condition, the patient is appropriate for inpatient status.  Dr. Froilan Doss will make that change.      The severity of illness, intensity of service provided, expected LOS and risk for adverse outcome make the care complex, high risk and appropriate for hospital admission.        The information on this document is developed by the utilization review team in order for the business office to ensure compliance.  This only denotes the appropriateness of proper admission status and does not reflect the quality of care rendered.         The definitions of Inpatient Status and Observation Status used in making the determination above are those provided in the CMS Coverage Manual, Chapter 1 and Chapter 6, section 70.4.      Sincerely,     Mike Mccann MD  Physician Advisor  Utilization Review/ Case Management  [facility].

## 2020-11-24 NOTE — PLAN OF CARE
ICU End of Shift Summary.  For vital signs and complete assessments, please see documentation flowsheets.     Pertinent assessments: vss. ls diminished. Infreq cough. Pt c/o chest tightness with nasal breathing. Tele sr. 02 HFNC 40L on 70%  sats were 88%. Resp rate 36-40. 02 increased to 75%. Sats remained 93-98%. Ot 173, sliding scale added. Pt tolerating sips of water (pt ate before transferring from obs).  Pt Malagasy speaking.  phone used for communication.     1455: attempted to wean to HFNC 40L 70%. sats 88%. Increased o2 back to 40L 75%.. sats increased to 99% with DB encouraged.     Major Shift Events:   Plan (Upcoming Events):   Discharge/Transfer Needs:     Bedside Shift Report Completed :   Bedside Safety Check Completed:

## 2020-11-24 NOTE — PLAN OF CARE
PRIMARY DIAGNOSIS: HYPOXIA, COVID 19  OUTPATIENT/OBSERVATION GOALS TO BE MET BEFORE DISCHARGE:  Dyspnea improved and O2 sats >88% on RA or back to baseline O2 levels: No   SpO2: 94 %, O2 Device: 4L O2 per Nasal cannula    Tolerating oral abx or appropriate plans made outpatient infusion:  Pt on IV Redesivir.    Vitals signs normal or return to baseline: No, pt still requiring O2     Short term supplemental O2 needed with activity at home: No    Tolerate oral intake to maintain hydration: Yes    Return to near baseline physical activity: No    Discharge Planner Nurse   Safe discharge environment identified: Yes  Barriers to discharge: Yes       Entered by: Karmen Tejada 11/24/2020 2:14 AM     Vitals are Temp: 96.4  F (35.8  C) Temp src: Oral BP: 115/75 Pulse: 86   Resp: (!) 40 SpO2: 94 %.  Patient is Alert and Oriented x4. They are SBA with Gait Belt.  Pt is on special precaution for Covid 19.  Pt is sob with exertion, desat to mid 80's with ambulation. Pt breathing is shallow and rapid. Pt needs verbal cue provided to slow breathing down and sats able to come back into mid 90's. Pt is a Regular diet.  They are denying pain.  Educated pt to use call light when need to use bathroom and sob. Patient is Saline locked. Bed alarms on. Will continue to monitor.     Please review provider order for any additional goals.   Nurse to notify provider when observation goals have been met and patient is ready for discharge.

## 2020-11-24 NOTE — PLAN OF CARE
ROOM # [id number]    Living Situation (if not independent, order SW consult):  Facility name:   : daughter or son, pt lives with spouse daughter and son      Activity level at baseline: Ind   Activity level on admit: SBA      Patient registered to observation; given Patient Bill of Rights; given the opportunity to ask questions about observation status and their plan of care.  Patient has been oriented to the observation room, bathroom and call light is in place.    Discussed discharge goals and expectations with patient/family.

## 2020-11-24 NOTE — PLAN OF CARE
"OUTPATIENT/OBSERVATION GOALS TO BE MET BEFORE DISCHARGE:  Dyspnea improved and O2 sats >88% on RA or back to baseline O2 levels: No   SpO2: 98 %, O2 Device: 5L Nasal cannula     Tolerating oral abx or appropriate plans made outpatient infusion:  IV antiviral remdesivir     Vitals signs normal or return to baseline: No     Short term supplemental O2 needed with activity at home:  as of right now, yes     Tolerate oral intake to maintain hydration: Yes     Return to near baseline physical activity: No     Discharge Planner Nurse   Safe discharge environment identified: Yes  Barriers to discharge: Yes       Entered by: Anne Villalobos 11/23/2020 10:24 PM  Please review provider order for any additional goals.   Nurse to notify provider when observation goals have been met and patient is ready for discharge.    Sating in upper 90s on 4L of O2, respirations in the 40s, afebrile. A&O. Up SBA with gait belt. Unable to take deep breaths as it triggers her cough, shallow breathing, jansen, sob at rest- prn robitussin given x1. Denies pain. Regular diet, little appetite. Pt states \"I'm just very very tired.\" Plan- continuous pulse ox-maintain oxygen sats so 94% or higher, prn robitussin available every 4 hours as needed, remdesivir every 24 hours, decadron daily.  "

## 2020-11-24 NOTE — PROGRESS NOTES
Pt transferred to room 311. Report given to receiving RN. Daughter updated on transfer and status. Belongings sent with. Respiratory helped with transfer and set high flow O2 back up upon arrival to unit. Pt tolerated transfer well.

## 2020-11-25 LAB
ALBUMIN SERPL-MCNC: 2.4 G/DL (ref 3.4–5)
ALP SERPL-CCNC: 72 U/L (ref 40–150)
ALT SERPL W P-5'-P-CCNC: 46 U/L (ref 0–50)
ANION GAP SERPL CALCULATED.3IONS-SCNC: 6 MMOL/L (ref 3–14)
AST SERPL W P-5'-P-CCNC: 49 U/L (ref 0–45)
BASOPHILS # BLD AUTO: 0 10E9/L (ref 0–0.2)
BASOPHILS NFR BLD AUTO: 0.1 %
BILIRUB DIRECT SERPL-MCNC: <0.1 MG/DL (ref 0–0.2)
BILIRUB SERPL-MCNC: 0.3 MG/DL (ref 0.2–1.3)
BUN SERPL-MCNC: 21 MG/DL (ref 7–30)
CALCIUM SERPL-MCNC: 8.6 MG/DL (ref 8.5–10.1)
CHLORIDE SERPL-SCNC: 115 MMOL/L (ref 94–109)
CO2 SERPL-SCNC: 23 MMOL/L (ref 20–32)
CREAT SERPL-MCNC: 0.6 MG/DL (ref 0.52–1.04)
CRP SERPL-MCNC: 43.6 MG/L (ref 0–8)
DIFFERENTIAL METHOD BLD: NORMAL
EOSINOPHIL # BLD AUTO: 0 10E9/L (ref 0–0.7)
EOSINOPHIL NFR BLD AUTO: 0 %
ERYTHROCYTE [DISTWIDTH] IN BLOOD BY AUTOMATED COUNT: 13 % (ref 10–15)
GFR SERPL CREATININE-BSD FRML MDRD: >90 ML/MIN/{1.73_M2}
GLUCOSE BLDC GLUCOMTR-MCNC: 100 MG/DL (ref 70–99)
GLUCOSE BLDC GLUCOMTR-MCNC: 121 MG/DL (ref 70–99)
GLUCOSE BLDC GLUCOMTR-MCNC: 130 MG/DL (ref 70–99)
GLUCOSE BLDC GLUCOMTR-MCNC: 140 MG/DL (ref 70–99)
GLUCOSE BLDC GLUCOMTR-MCNC: 145 MG/DL (ref 70–99)
GLUCOSE SERPL-MCNC: 110 MG/DL (ref 70–99)
HCT VFR BLD AUTO: 41.2 % (ref 35–47)
HGB BLD-MCNC: 13.3 G/DL (ref 11.7–15.7)
IMM GRANULOCYTES # BLD: 0.1 10E9/L (ref 0–0.4)
IMM GRANULOCYTES NFR BLD: 0.7 %
LYMPHOCYTES # BLD AUTO: 1.6 10E9/L (ref 0.8–5.3)
LYMPHOCYTES NFR BLD AUTO: 18.3 %
MCH RBC QN AUTO: 31.6 PG (ref 26.5–33)
MCHC RBC AUTO-ENTMCNC: 32.3 G/DL (ref 31.5–36.5)
MCV RBC AUTO: 98 FL (ref 78–100)
MONOCYTES # BLD AUTO: 0.9 10E9/L (ref 0–1.3)
MONOCYTES NFR BLD AUTO: 11 %
NEUTROPHILS # BLD AUTO: 5.9 10E9/L (ref 1.6–8.3)
NEUTROPHILS NFR BLD AUTO: 69.9 %
NRBC # BLD AUTO: 0 10*3/UL
NRBC BLD AUTO-RTO: 0 /100
PLATELET # BLD AUTO: 311 10E9/L (ref 150–450)
PLATELET # BLD EST: NORMAL 10*3/UL
POTASSIUM SERPL-SCNC: 3.7 MMOL/L (ref 3.4–5.3)
PROT SERPL-MCNC: 7.1 G/DL (ref 6.8–8.8)
RBC # BLD AUTO: 4.21 10E12/L (ref 3.8–5.2)
RBC MORPH BLD: NORMAL
SODIUM SERPL-SCNC: 144 MMOL/L (ref 133–144)
VARIANT LYMPHS BLD QL SMEAR: PRESENT
WBC # BLD AUTO: 8.5 10E9/L (ref 4–11)

## 2020-11-25 PROCEDURE — 200N000001 HC R&B ICU

## 2020-11-25 PROCEDURE — 120N000001 HC R&B MED SURG/OB

## 2020-11-25 PROCEDURE — 999N000215 HC STATISTIC HFNC ADULT NON-CPAP

## 2020-11-25 PROCEDURE — 99233 SBSQ HOSP IP/OBS HIGH 50: CPT | Performed by: INTERNAL MEDICINE

## 2020-11-25 PROCEDURE — 120N000013 HC R&B IMCU

## 2020-11-25 PROCEDURE — 93010 ELECTROCARDIOGRAM REPORT: CPT | Performed by: INTERNAL MEDICINE

## 2020-11-25 PROCEDURE — 250N000011 HC RX IP 250 OP 636: Performed by: HOSPITALIST

## 2020-11-25 PROCEDURE — 93005 ELECTROCARDIOGRAM TRACING: CPT

## 2020-11-25 PROCEDURE — 999N001017 HC STATISTIC GLUCOSE BY METER IP

## 2020-11-25 PROCEDURE — 250N000009 HC RX 250: Performed by: HOSPITALIST

## 2020-11-25 PROCEDURE — 258N000003 HC RX IP 258 OP 636: Performed by: HOSPITALIST

## 2020-11-25 PROCEDURE — 250N000012 HC RX MED GY IP 250 OP 636 PS 637: Performed by: HOSPITALIST

## 2020-11-25 PROCEDURE — 80076 HEPATIC FUNCTION PANEL: CPT | Performed by: HOSPITALIST

## 2020-11-25 PROCEDURE — 94660 CPAP INITIATION&MGMT: CPT

## 2020-11-25 PROCEDURE — 999N000157 HC STATISTIC RCP TIME EA 10 MIN

## 2020-11-25 PROCEDURE — 80048 BASIC METABOLIC PNL TOTAL CA: CPT | Performed by: HOSPITALIST

## 2020-11-25 PROCEDURE — 36415 COLL VENOUS BLD VENIPUNCTURE: CPT | Performed by: HOSPITALIST

## 2020-11-25 PROCEDURE — 86140 C-REACTIVE PROTEIN: CPT | Performed by: HOSPITALIST

## 2020-11-25 PROCEDURE — 85025 COMPLETE CBC W/AUTO DIFF WBC: CPT | Performed by: HOSPITALIST

## 2020-11-25 PROCEDURE — 94640 AIRWAY INHALATION TREATMENT: CPT

## 2020-11-25 PROCEDURE — 250N000013 HC RX MED GY IP 250 OP 250 PS 637: Performed by: HOSPITALIST

## 2020-11-25 RX ORDER — PRAVASTATIN SODIUM 20 MG
40 TABLET ORAL AT BEDTIME
Status: DISCONTINUED | OUTPATIENT
Start: 2020-11-25 | End: 2020-12-05 | Stop reason: HOSPADM

## 2020-11-25 RX ORDER — CARBOXYMETHYLCELLULOSE SODIUM 5 MG/ML
1 SOLUTION/ DROPS OPHTHALMIC
Status: DISCONTINUED | OUTPATIENT
Start: 2020-11-25 | End: 2020-12-05 | Stop reason: HOSPADM

## 2020-11-25 RX ORDER — NITROGLYCERIN 0.4 MG/1
0.4 TABLET SUBLINGUAL EVERY 5 MIN PRN
Status: DISCONTINUED | OUTPATIENT
Start: 2020-11-25 | End: 2020-12-05 | Stop reason: HOSPADM

## 2020-11-25 RX ORDER — LIDOCAINE 40 MG/G
CREAM TOPICAL
Status: DISCONTINUED | OUTPATIENT
Start: 2020-11-25 | End: 2020-11-25

## 2020-11-25 RX ADMIN — ENOXAPARIN SODIUM 40 MG: 40 INJECTION SUBCUTANEOUS at 08:23

## 2020-11-25 RX ADMIN — FAMOTIDINE 20 MG: 20 TABLET ORAL at 08:22

## 2020-11-25 RX ADMIN — GUAIFENESIN AND CODEINE PHOSPHATE 5 ML: 10; 100 LIQUID ORAL at 21:06

## 2020-11-25 RX ADMIN — DEXAMETHASONE 6 MG: 2 TABLET ORAL at 09:31

## 2020-11-25 RX ADMIN — REMDESIVIR 100 MG: 100 INJECTION, POWDER, LYOPHILIZED, FOR SOLUTION INTRAVENOUS at 17:51

## 2020-11-25 RX ADMIN — ENOXAPARIN SODIUM 40 MG: 40 INJECTION SUBCUTANEOUS at 21:06

## 2020-11-25 RX ADMIN — ALBUTEROL SULFATE 2 PUFF: 90 AEROSOL, METERED RESPIRATORY (INHALATION) at 08:57

## 2020-11-25 ASSESSMENT — ACTIVITIES OF DAILY LIVING (ADL)
ADLS_ACUITY_SCORE: 20
ADLS_ACUITY_SCORE: 18
ADLS_ACUITY_SCORE: 18
ADLS_ACUITY_SCORE: 20

## 2020-11-25 NOTE — PROGRESS NOTES
ICU End of Shift Summary.  For vital signs and complete assessments, please see documentation flowsheets.     Pertinent assessments: A&O, [place] speaking. High Flow increased to 85% on 45L due to desatting while sleeping, can likely wean when awake. LS diminshed. Chest tightness with activity. RR 30-35s. Tele SR. Up to BSC. Sliding scale continued. Pt tolerating small sips of water.  phone in use.   Major Shift Events: Increased O2 needs while sleeping  Plan (Upcoming Events): wean O2  Discharge/Transfer Needs: tbd    Bedside Shift Report Completed : y  Bedside Safety Check Completed: y

## 2020-11-25 NOTE — PROGRESS NOTES
Pt A&O. Om high flow 90%, 50L. Lungs are diminished. Tele SR. Pt was breathing on her own at 70% with HFNC machine dysfunction for 2-3 minutes but regained back on 15L HFNC and then a new HFNC machine. Respiration in the 20's. A febrile. Denies pain. Spitting white sputum w/o any cough.

## 2020-11-25 NOTE — PROGRESS NOTES
The HFNC was applied to pt @ 40LPM and 80% for PEEP therapy.     Skin integrity is intact    Skin interventions N/A    Temp: 98.4  F (36.9  C) Temp src: Oral BP: 121/75 Pulse: 95   Resp: (!) 36 SpO2: 92 % O2 Device: High Flow Nasal Cannula (HFNC) Oxygen Delivery: 45 LPM     Will continue to monitor, no complications noted at this time.    Liz Medrano, RT

## 2020-11-25 NOTE — PROGRESS NOTES
[facility]  Hospitalist Progress Note  Glenroy Olson MD 11/25/20    Reason for Stay (Diagnosis): COVID-19         Assessment and Plan:      Summary of Stay: Day Russell is a 60 year old female with past medical history of HTN, HLD, and GERD who was admitted on 11/23/2020 after presenting with shortness of breath and fevers for over 1 week and found to be hypoxic.  CTPA ruled out PE, but did show significant diffuse patchy groundglass opacities consistent with COVID-19 infection.  Reportedly she was diagnosed with COVID-19 on 11/19.  She was started on IV Decadron and IV Remdesevir.  Worsening hypoxia and increased work of breathing requiring high flow nasal cannula.  More tachypneic this morning and short of breath despite maintaining O2 sats so transitioned to BiPAP due to increased work of breathing.    Problem List/Assessment and Plan:   Acute hypoxemic respiratory failure secondary to COVID-19 pneumonia: Presenting with 1 week of progressive shortness of breath and fevers.  Found to be hypoxic.  CTPA shows no PE, but shows significant diffuse groundglass opacities consistent with COVID-19.  Reportedly tested positive for COVID-19 on 11/19, results not available to me.  Low-grade temperatures here.  Elevated CRP.  -Continue IV Remdesevir day 3/5  -Continue IV Decadron 6 mg daily day 3/10  -Tachypneic on HFNC 50 L/min FiO2 100%, O2 sat 97%.  Due to increased work of breathing ongoing shortness of breath placed on BiPAP this afternoon.  Anticipate need for intermittent BiPAP/HFNC.  Okay to eat when on HFNC if appears relatively stable this evening around dinner  -Robitussin with codeine as needed  -Lovenox 40 mg twice daily for DVT prophylaxis  -Albuterol HFA if wheezing    Chest pain: Suspect secondary to COVID-19 pneumonia.  No PE on CT.  Troponin negative x2.    Elevated transaminases: Mild elevation secondary to COVID-19.  Trend while on Remdesevir.    Hyperglycemia: No history of diabetes.  " Secondary to steroids.  Sliding scale insulin as needed.    HLD: Continue PTA pravastatin 40 mg at bedtime.      DVT Prophylaxis: Enoxaparin (Lovenox) SQ 40 mg twice daily  Code Status: Full Code  FEN: Regular diet  Discharge Dispo: TBD  Estimated Disch Date / # of Days until Disch: At least 5 days given need for BiPAP/HFNC    Son Alejandro updated via phone today        Interval History (Subjective):      Assumed care today.  She remains on high flow nasal cannula at 70% FiO2.  She reported multiple times during our conversation that she still feels short of breath.  She is notably tachypneic.  She has minimal cough.  Currently afebrile.  Does not have much of an appetite.  Denies any nausea.    Entire encounter completed with assistance of telephone Laotian                   Physical Exam:      Last Vital Signs:  /75   Pulse 78   Temp 97.7  F (36.5  C) (Oral)   Resp (!) 45   Ht 1.448 m (4' 9\")   Wt 65.6 kg (144 lb 9.6 oz)   SpO2 98%   BMI 31.29 kg/m        Intake/Output Summary (Last 24 hours) at 11/25/2020 1534  Last data filed at 11/25/2020 1400  Gross per 24 hour   Intake 100 ml   Output --   Net 100 ml       Constitutional: Awake, appears fatigued, but no distress  Eyes: sclera white   HEENT:   MMM  Respiratory: She is tachypneic with rates mid 30s.  On HFNC.  Has some bilateral rhonchi.  No wheeze.  Cardiovascular: RRR.  No murmur   GI: non-tender, not distended, bowel sounds present  Skin: no rash  Musculoskeletal/extremities: No edema  Neurologic: A&O  Psychiatric: calm, cooperative         Medications:      All current medications were reviewed with changes reflected in problem list.         Data:      All new lab and imaging data was reviewed.   Labs:  Recent Labs   Lab 11/25/20  0605 11/24/20  0508 11/23/20  1331    143 139   POTASSIUM 3.7 4.2 3.4   CHLORIDE 115* 113* 110*   CO2 23 24 23   ANIONGAP 6 6 6   * 132* 94   BUN 21 9 9   CR 0.60 0.44* 0.65   GFRESTIMATED >90 >90 " >90   GFRESTBLACK >90 >90 >90   LEELA 8.6 7.9* 8.2*     Recent Labs   Lab 11/25/20  0605 11/24/20  0508 11/23/20  1331   WBC 8.5 4.1 5.6   HGB 13.3 13.5 14.5   HCT 41.2 41.8 44.3   MCV 98 99 98    215 212     Recent Labs   Lab 11/25/20  0605 11/24/20  0508 11/23/20  1850   CRP 43.6* 125.0* 110.0*      Imaging:   None today      Glenroy Olson MD

## 2020-11-26 LAB
ANION GAP SERPL CALCULATED.3IONS-SCNC: 6 MMOL/L (ref 3–14)
BASE DEFICIT BLDV-SCNC: 8.7 MMOL/L
BASOPHILS # BLD AUTO: 0 10E9/L (ref 0–0.2)
BASOPHILS NFR BLD AUTO: 0 %
BUN SERPL-MCNC: 25 MG/DL (ref 7–30)
CALCIUM SERPL-MCNC: 8.4 MG/DL (ref 8.5–10.1)
CHLORIDE SERPL-SCNC: 115 MMOL/L (ref 94–109)
CO2 SERPL-SCNC: 24 MMOL/L (ref 20–32)
CREAT SERPL-MCNC: 0.55 MG/DL (ref 0.52–1.04)
CRP SERPL-MCNC: 15 MG/L (ref 0–8)
DIFFERENTIAL METHOD BLD: NORMAL
EOSINOPHIL # BLD AUTO: 0 10E9/L (ref 0–0.7)
EOSINOPHIL NFR BLD AUTO: 0 %
ERYTHROCYTE [DISTWIDTH] IN BLOOD BY AUTOMATED COUNT: 12.9 % (ref 10–15)
GFR SERPL CREATININE-BSD FRML MDRD: >90 ML/MIN/{1.73_M2}
GLUCOSE BLDC GLUCOMTR-MCNC: 112 MG/DL (ref 70–99)
GLUCOSE BLDC GLUCOMTR-MCNC: 114 MG/DL (ref 70–99)
GLUCOSE BLDC GLUCOMTR-MCNC: 141 MG/DL (ref 70–99)
GLUCOSE BLDC GLUCOMTR-MCNC: 158 MG/DL (ref 70–99)
GLUCOSE BLDC GLUCOMTR-MCNC: 91 MG/DL (ref 70–99)
GLUCOSE SERPL-MCNC: 108 MG/DL (ref 70–99)
HCO3 BLDV-SCNC: 18 MMOL/L (ref 21–28)
HCT VFR BLD AUTO: 42.9 % (ref 35–47)
HGB BLD-MCNC: 14 G/DL (ref 11.7–15.7)
LDH SERPL L TO P-CCNC: 473 U/L (ref 81–234)
LYMPHOCYTES # BLD AUTO: 1.7 10E9/L (ref 0.8–5.3)
LYMPHOCYTES NFR BLD AUTO: 17 %
MCH RBC QN AUTO: 32.3 PG (ref 26.5–33)
MCHC RBC AUTO-ENTMCNC: 32.6 G/DL (ref 31.5–36.5)
MCV RBC AUTO: 99 FL (ref 78–100)
MONOCYTES # BLD AUTO: 0.7 10E9/L (ref 0–1.3)
MONOCYTES NFR BLD AUTO: 7 %
NEUTROPHILS # BLD AUTO: 7.8 10E9/L (ref 1.6–8.3)
NEUTROPHILS NFR BLD AUTO: 76 %
O2/TOTAL GAS SETTING VFR VENT: ABNORMAL %
OXYHGB MFR BLDV: 75 %
PCO2 BLDV: 40 MM HG (ref 40–50)
PH BLDV: 7.26 PH (ref 7.32–7.43)
PLATELET # BLD AUTO: 335 10E9/L (ref 150–450)
PLATELET # BLD EST: NORMAL 10*3/UL
PO2 BLDV: 36 MM HG (ref 25–47)
POTASSIUM SERPL-SCNC: 3.8 MMOL/L (ref 3.4–5.3)
RBC # BLD AUTO: 4.34 10E12/L (ref 3.8–5.2)
RBC MORPH BLD: NORMAL
SODIUM SERPL-SCNC: 145 MMOL/L (ref 133–144)
VARIANT LYMPHS BLD QL SMEAR: PRESENT
WBC # BLD AUTO: 10.2 10E9/L (ref 4–11)

## 2020-11-26 PROCEDURE — 200N000001 HC R&B ICU

## 2020-11-26 PROCEDURE — 258N000003 HC RX IP 258 OP 636: Performed by: HOSPITALIST

## 2020-11-26 PROCEDURE — 999N001017 HC STATISTIC GLUCOSE BY METER IP

## 2020-11-26 PROCEDURE — 250N000011 HC RX IP 250 OP 636: Performed by: HOSPITALIST

## 2020-11-26 PROCEDURE — 120N000001 HC R&B MED SURG/OB

## 2020-11-26 PROCEDURE — 250N000013 HC RX MED GY IP 250 OP 250 PS 637: Performed by: INTERNAL MEDICINE

## 2020-11-26 PROCEDURE — 80048 BASIC METABOLIC PNL TOTAL CA: CPT | Performed by: HOSPITALIST

## 2020-11-26 PROCEDURE — 94660 CPAP INITIATION&MGMT: CPT

## 2020-11-26 PROCEDURE — 999N000157 HC STATISTIC RCP TIME EA 10 MIN

## 2020-11-26 PROCEDURE — 250N000013 HC RX MED GY IP 250 OP 250 PS 637: Performed by: HOSPITALIST

## 2020-11-26 PROCEDURE — 83615 LACTATE (LD) (LDH) ENZYME: CPT | Performed by: HOSPITALIST

## 2020-11-26 PROCEDURE — 99233 SBSQ HOSP IP/OBS HIGH 50: CPT | Performed by: HOSPITALIST

## 2020-11-26 PROCEDURE — 85025 COMPLETE CBC W/AUTO DIFF WBC: CPT | Performed by: HOSPITALIST

## 2020-11-26 PROCEDURE — 36415 COLL VENOUS BLD VENIPUNCTURE: CPT | Performed by: HOSPITALIST

## 2020-11-26 PROCEDURE — 86140 C-REACTIVE PROTEIN: CPT | Performed by: HOSPITALIST

## 2020-11-26 PROCEDURE — 250N000009 HC RX 250: Performed by: HOSPITALIST

## 2020-11-26 PROCEDURE — 82805 BLOOD GASES W/O2 SATURATION: CPT | Performed by: HOSPITALIST

## 2020-11-26 RX ORDER — DEXAMETHASONE SODIUM PHOSPHATE 4 MG/ML
6 INJECTION, SOLUTION INTRA-ARTICULAR; INTRALESIONAL; INTRAMUSCULAR; INTRAVENOUS; SOFT TISSUE EVERY 24 HOURS
Status: COMPLETED | OUTPATIENT
Start: 2020-11-26 | End: 2020-12-03

## 2020-11-26 RX ORDER — LORAZEPAM 2 MG/ML
.5-1 INJECTION INTRAMUSCULAR EVERY 4 HOURS PRN
Status: DISCONTINUED | OUTPATIENT
Start: 2020-11-26 | End: 2020-12-05 | Stop reason: HOSPADM

## 2020-11-26 RX ADMIN — ENOXAPARIN SODIUM 40 MG: 40 INJECTION SUBCUTANEOUS at 19:38

## 2020-11-26 RX ADMIN — LORAZEPAM 0.5 MG: 2 INJECTION INTRAMUSCULAR; INTRAVENOUS at 19:38

## 2020-11-26 RX ADMIN — DOCUSATE SODIUM AND SENNOSIDES 1 TABLET: 8.6; 5 TABLET ORAL at 19:38

## 2020-11-26 RX ADMIN — DEXAMETHASONE SODIUM PHOSPHATE 6 MG: 4 INJECTION, SOLUTION INTRAMUSCULAR; INTRAVENOUS at 09:13

## 2020-11-26 RX ADMIN — INSULIN ASPART 1 UNITS: 100 INJECTION, SOLUTION INTRAVENOUS; SUBCUTANEOUS at 18:49

## 2020-11-26 RX ADMIN — GUAIFENESIN AND CODEINE PHOSPHATE 5 ML: 10; 100 LIQUID ORAL at 16:23

## 2020-11-26 RX ADMIN — LORAZEPAM 0.5 MG: 2 INJECTION INTRAMUSCULAR; INTRAVENOUS at 10:27

## 2020-11-26 RX ADMIN — PRAVASTATIN SODIUM 40 MG: 20 TABLET ORAL at 22:42

## 2020-11-26 RX ADMIN — REMDESIVIR 100 MG: 100 INJECTION, POWDER, LYOPHILIZED, FOR SOLUTION INTRAVENOUS at 18:08

## 2020-11-26 RX ADMIN — GUAIFENESIN AND CODEINE PHOSPHATE 5 ML: 10; 100 LIQUID ORAL at 06:13

## 2020-11-26 RX ADMIN — FAMOTIDINE 20 MG: 20 TABLET ORAL at 19:38

## 2020-11-26 RX ADMIN — ENOXAPARIN SODIUM 40 MG: 40 INJECTION SUBCUTANEOUS at 09:13

## 2020-11-26 RX ADMIN — ALBUTEROL SULFATE 2 PUFF: 90 AEROSOL, METERED RESPIRATORY (INHALATION) at 18:08

## 2020-11-26 RX ADMIN — GUAIFENESIN AND CODEINE PHOSPHATE 5 ML: 10; 100 LIQUID ORAL at 20:44

## 2020-11-26 ASSESSMENT — ACTIVITIES OF DAILY LIVING (ADL)
ADLS_ACUITY_SCORE: 20
ADLS_ACUITY_SCORE: 21

## 2020-11-26 ASSESSMENT — MIFFLIN-ST. JEOR: SCORE: 1103.88

## 2020-11-26 NOTE — PROGRESS NOTES
[facility]    Hospitalist Progress Note      Assessment & Plan   Day Russell is a 60 year old female with past medical history of HTN, HLD, and GERD who was admitted on 11/23/2020 after presenting with shortness of breath and fevers for over 1 week and found to be hypoxic.  CTPA ruled out PE, but did show significant diffuse patchy groundglass opacities consistent with COVID-19 infection.  Reportedly she was diagnosed with COVID-19 on 11/19.      #Acute hypoxemic respiratory failure secondary to COVID-19 pneumonia: Presenting with 1 week of progressive shortness of breath and fevers.  Found to be hypoxic.  CTPA shows no PE, but shows significant diffuse groundglass opacities consistent with COVID-19.  Reportedly tested positive for COVID-19 on 11/19, results not available to me.  Low-grade temperatures here.  Elevated CRP.  -Continue IV Remdesevir day 4/5  -Continue IV Decadron 6 mg daily day 4/10  -Patient does have tachypnea.  She is now requiring BiPAP therapy.  She is stable at 70% FiO2 50 L.  I have made as needed Ativan available so that patient can tolerate BiPAP therapy.  -Robitussin with codeine as needed  -Lovenox 40 mg twice daily for DVT prophylaxis  -Albuterol HFA if wheezing  -We will maintain ICU status given need for BiPAP therapy continuously.  Discussed with the on-call intensivist plan as above.     #Chest pain: Suspect secondary to COVID-19 pneumonia.  No PE on CT.  Troponin negative x2.     #Elevated transaminases: Mild elevation secondary to COVID-19.  Trend while on Remdesevir.     #Hyperglycemia: No history of diabetes.  Secondary to steroids.  Sliding scale insulin as needed.     #HLD: Continue PTA pravastatin 40 mg at bedtime.     DVT Prophylaxis: Enoxaparin (Lovenox) SQ 40 mg twice daily  Code Status: Full Code  Dispo: ICU status given need for continuous bipap. If worsening hypoxemia then would require intubation.      Simon Mon MD  Text Page    Interval  History   Assumed care today.  Patient now requiring BiPAP for hypoxemia and work of breathing.  Patient with continued shortness of breath.  She feels like she is worse than yesterday.  Denies any current chest pain.  No fevers.  She was able to talk with daughter through BiPAP mask.    -Data reviewed today: I reviewed all new labs and imaging results over the last 24 hours.     Physical Exam   Temp: 97.9  F (36.6  C) Temp src: Axillary BP: 131/48 Pulse: 88   Resp: (!) 34 SpO2: 92 % O2 Device: High Flow Nasal Cannula (HFNC) Oxygen Delivery: 50 LPM  Vitals:    11/23/20 1745 11/24/20 0650 11/26/20 0500   Weight: 66.6 kg (146 lb 14.4 oz) 65.6 kg (144 lb 9.6 oz) 65 kg (143 lb 4.8 oz)     Vital Signs with Ranges  Temp:  [97.9  F (36.6  C)-98.8  F (37.1  C)] 97.9  F (36.6  C)  Pulse:  [51-91] 88  Resp:  [32-52] 34  BP: (106-135)/(48-91) 131/48  FiO2 (%):  [70 %-80 %] 70 %  SpO2:  [92 %-100 %] 92 %  I/O last 3 completed shifts:  In: 0   Out: 200 [Urine:200]    Constitutional: Bipap mask in place. Tachypnea noted to 30's.   HEENT: Normocephalic. MMM, No elevation of JVD noted.   Respiratory: +Tachypnea. Coarse bilaterally. No wheezes. Able to speak with daughter on phone.   Cardiovascular: Regular, no murmur  GI: BS+, NT, ND  Skin/Integumen: WWP, no rash. No edema  Neuro: CNII-XII intact. Moves all extremities. No tremor. A&Ox3.    Medications     - MEDICATION INSTRUCTIONS -       - MEDICATION INSTRUCTIONS -       - MEDICATION INSTRUCTIONS -       - MEDICATION INSTRUCTIONS -         albuterol  2 puff Inhalation 4x Daily     dexamethasone  6 mg Intravenous Q24H     enoxaparin ANTICOAGULANT  40 mg Subcutaneous BID     famotidine  20 mg Oral BID     insulin aspart  1-7 Units Subcutaneous TID AC     insulin aspart  1-5 Units Subcutaneous At Bedtime     polyethylene glycol  17 g Oral Daily     pravastatin  40 mg Oral At Bedtime     remdesivir  100 mg Intravenous Q24H     senna-docusate  1 tablet Oral BID    Or      senna-docusate  2 tablet Oral BID     sodium chloride (PF)  3 mL Intracatheter Q8H       Data   Recent Labs   Lab 11/26/20  0658 11/25/20  0605 11/24/20  0508 11/23/20  1331   WBC 10.2 8.5 4.1 5.6   HGB 14.0 13.3 13.5 14.5   MCV 99 98 99 98    311 215 212   INR  --   --  0.95 0.82*   * 144 143 139   POTASSIUM 3.8 3.7 4.2 3.4   CHLORIDE 115* 115* 113* 110*   CO2 24 23 24 23   BUN 25 21 9 9   CR 0.55 0.60 0.44* 0.65   ANIONGAP 6 6 6 6   LEELA 8.4* 8.6 7.9* 8.2*   * 110* 132* 94   ALBUMIN  --  2.4* 2.5* 2.8*   PROTTOTAL  --  7.1 7.2 7.7   BILITOTAL  --  0.3 0.4 0.2   ALKPHOS  --  72 73 73   ALT  --  46 51* 50   AST  --  49* 70* 75*   TROPI  --   --  <0.015 <0.015       No results found for this or any previous visit (from the past 24 hour(s)).

## 2020-11-26 NOTE — PROGRESS NOTES
Respiratory Therapy Note    A BiPAP of 12/6 @ 80% was applied to the pt via the mask for an increase in WOB and/or SOB. The bridge of the nose is protected with gel pad in place. Pt is tolerating it well. Will continue to monitor and assess the pt's current respiratory status and needs.    Melva Meza, RT  12:59 PM November 25, 2020

## 2020-11-26 NOTE — PROGRESS NOTES
Cross cover    Patient with persistent chest pain.  Patient states worse with cough.  EKG obtained shows no acute changes.  Patient with Tylenol as needed for pain.

## 2020-11-26 NOTE — PLAN OF CARE
ICU End of Shift Summary.  For vital signs and complete assessments, please see documentation flowsheets.     Pertinent assessments:pt wore bipap over night and was tachypenic all other vss. Pt experienced dyspnea with exertion to the commode but recovered well. Pt able to make needs known and  phone used when needed.   Major Shift Events: none  Plan (Upcoming Events): try to wean bipap as possible  Discharge/Transfer Needs: tbd    Bedside Shift Report Completed :   Bedside Safety Check Completed:

## 2020-11-26 NOTE — PROGRESS NOTES
An EKG was completed on the pt, with no complications noted during the procedure.    Mike Graham, RT on 11/25/2020 at 8:59 PM

## 2020-11-26 NOTE — PLAN OF CARE
ICU End of Shift Summary.  For vital signs and complete assessments, please see documentation flowsheets.     Pertinent assessments: a&ox4. No c/o pain. Tele is SR. On bipap, sats in mid to upper 90s. Up to bsc with assist of one.   Major Shift Events: switched from hiflow nc to bipap. May alternate between the 2.   Plan (Upcoming Events): wean from O2  Discharge/Transfer Needs: tbd    Bedside Shift Report Completed : y  Bedside Safety Check Completed: y

## 2020-11-26 NOTE — PROVIDER NOTIFICATION
08:06 Notified Dr. Mon that respiratory rate is mid 40's to 50's. Pt is on bipap 75% fi02 12/6. MD came to the bedside. RT called to consult about changing facemask for a better fit. RT says pt has the smallest size mask. RT increased bipap to 14/7. Pt reports after the change her breathing is better. Will continue to monitor closely. Plan is to change decadron to I.V and keep pt NPO while on bipap.       10:20   Dr. Mon paged to suggest ativan and change pt's status from IMC to ICU. MD came to the bedside and updated pt's daughter and placed an order for vgb and ativan. Will continue to monitor closely. Pt is now ICU status.

## 2020-11-26 NOTE — PROGRESS NOTES
A BiPAP of  12/6 @ 75% was applied to the pt via the mask for an increase in WOB and/or SOB.  The bridge of the nose has gel pad in place. Pt is tolerating it well. Bs diminished. Will continue to monitor and assess the pt's current respiratory status and needs.    Mike Graham, RT on 11/26/2020 at 5:46 AM

## 2020-11-27 LAB
ANION GAP SERPL CALCULATED.3IONS-SCNC: 5 MMOL/L (ref 3–14)
BASE DEFICIT BLDV-SCNC: 0.5 MMOL/L
BASOPHILS # BLD AUTO: 0.1 10E9/L (ref 0–0.2)
BASOPHILS NFR BLD AUTO: 0.3 %
BUN SERPL-MCNC: 22 MG/DL (ref 7–30)
CALCIUM SERPL-MCNC: 8.3 MG/DL (ref 8.5–10.1)
CHLORIDE SERPL-SCNC: 113 MMOL/L (ref 94–109)
CO2 SERPL-SCNC: 24 MMOL/L (ref 20–32)
CREAT SERPL-MCNC: 0.58 MG/DL (ref 0.52–1.04)
CRP SERPL-MCNC: 13 MG/L (ref 0–8)
D DIMER PPP FEU-MCNC: 0.5 UG/ML FEU (ref 0–0.5)
DIFFERENTIAL METHOD BLD: ABNORMAL
EOSINOPHIL # BLD AUTO: 0.5 10E9/L (ref 0–0.7)
EOSINOPHIL NFR BLD AUTO: 3.4 %
ERYTHROCYTE [DISTWIDTH] IN BLOOD BY AUTOMATED COUNT: 12.6 % (ref 10–15)
FIBRINOGEN PPP-MCNC: 682 MG/DL (ref 200–420)
GFR SERPL CREATININE-BSD FRML MDRD: >90 ML/MIN/{1.73_M2}
GLUCOSE BLDC GLUCOMTR-MCNC: 124 MG/DL (ref 70–99)
GLUCOSE BLDC GLUCOMTR-MCNC: 125 MG/DL (ref 70–99)
GLUCOSE BLDC GLUCOMTR-MCNC: 176 MG/DL (ref 70–99)
GLUCOSE BLDC GLUCOMTR-MCNC: 74 MG/DL (ref 70–99)
GLUCOSE SERPL-MCNC: 91 MG/DL (ref 70–99)
HCO3 BLDV-SCNC: 25 MMOL/L (ref 21–28)
HCT VFR BLD AUTO: 47.5 % (ref 35–47)
HGB BLD-MCNC: 15.8 G/DL (ref 11.7–15.7)
IMM GRANULOCYTES # BLD: 0.1 10E9/L (ref 0–0.4)
IMM GRANULOCYTES NFR BLD: 0.7 %
INR PPP: 1.11 (ref 0.86–1.14)
LDH SERPL L TO P-CCNC: 535 U/L (ref 81–234)
LYMPHOCYTES # BLD AUTO: 2.2 10E9/L (ref 0.8–5.3)
LYMPHOCYTES NFR BLD AUTO: 13.7 %
MCH RBC QN AUTO: 32 PG (ref 26.5–33)
MCHC RBC AUTO-ENTMCNC: 33.3 G/DL (ref 31.5–36.5)
MCV RBC AUTO: 96 FL (ref 78–100)
MONOCYTES # BLD AUTO: 1.5 10E9/L (ref 0–1.3)
MONOCYTES NFR BLD AUTO: 9.4 %
NEUTROPHILS # BLD AUTO: 11.6 10E9/L (ref 1.6–8.3)
NEUTROPHILS NFR BLD AUTO: 72.5 %
NRBC # BLD AUTO: 0 10*3/UL
NRBC BLD AUTO-RTO: 0 /100
O2/TOTAL GAS SETTING VFR VENT: NORMAL %
OXYHGB MFR BLDV: 67 %
PCO2 BLDV: 42 MM HG (ref 40–50)
PH BLDV: 7.38 PH (ref 7.32–7.43)
PLATELET # BLD AUTO: 305 10E9/L (ref 150–450)
PO2 BLDV: 37 MM HG (ref 25–47)
POTASSIUM SERPL-SCNC: 3.6 MMOL/L (ref 3.4–5.3)
PROCALCITONIN SERPL-MCNC: <0.05 NG/ML
RBC # BLD AUTO: 4.93 10E12/L (ref 3.8–5.2)
RETICS # AUTO: 43.9 10E9/L (ref 25–95)
RETICS/RBC NFR AUTO: 0.9 % (ref 0.5–2)
SODIUM SERPL-SCNC: 142 MMOL/L (ref 133–144)
WBC # BLD AUTO: 16 10E9/L (ref 4–11)

## 2020-11-27 PROCEDURE — 250N000011 HC RX IP 250 OP 636: Performed by: HOSPITALIST

## 2020-11-27 PROCEDURE — 250N000013 HC RX MED GY IP 250 OP 250 PS 637: Performed by: INTERNAL MEDICINE

## 2020-11-27 PROCEDURE — 94799 UNLISTED PULMONARY SVC/PX: CPT

## 2020-11-27 PROCEDURE — 85610 PROTHROMBIN TIME: CPT | Performed by: HOSPITALIST

## 2020-11-27 PROCEDURE — 99233 SBSQ HOSP IP/OBS HIGH 50: CPT | Performed by: HOSPITALIST

## 2020-11-27 PROCEDURE — 120N000001 HC R&B MED SURG/OB

## 2020-11-27 PROCEDURE — 999N000157 HC STATISTIC RCP TIME EA 10 MIN

## 2020-11-27 PROCEDURE — 94640 AIRWAY INHALATION TREATMENT: CPT | Mod: 76

## 2020-11-27 PROCEDURE — 85045 AUTOMATED RETICULOCYTE COUNT: CPT | Performed by: HOSPITALIST

## 2020-11-27 PROCEDURE — 83615 LACTATE (LD) (LDH) ENZYME: CPT | Performed by: HOSPITALIST

## 2020-11-27 PROCEDURE — 86140 C-REACTIVE PROTEIN: CPT | Performed by: HOSPITALIST

## 2020-11-27 PROCEDURE — 200N000001 HC R&B ICU

## 2020-11-27 PROCEDURE — 36415 COLL VENOUS BLD VENIPUNCTURE: CPT | Performed by: HOSPITALIST

## 2020-11-27 PROCEDURE — 85379 FIBRIN DEGRADATION QUANT: CPT | Performed by: HOSPITALIST

## 2020-11-27 PROCEDURE — 80048 BASIC METABOLIC PNL TOTAL CA: CPT | Performed by: HOSPITALIST

## 2020-11-27 PROCEDURE — 94640 AIRWAY INHALATION TREATMENT: CPT

## 2020-11-27 PROCEDURE — 85025 COMPLETE CBC W/AUTO DIFF WBC: CPT | Performed by: HOSPITALIST

## 2020-11-27 PROCEDURE — 82805 BLOOD GASES W/O2 SATURATION: CPT | Performed by: HOSPITALIST

## 2020-11-27 PROCEDURE — 250N000013 HC RX MED GY IP 250 OP 250 PS 637: Performed by: HOSPITALIST

## 2020-11-27 PROCEDURE — 85384 FIBRINOGEN ACTIVITY: CPT | Performed by: HOSPITALIST

## 2020-11-27 PROCEDURE — 258N000003 HC RX IP 258 OP 636: Performed by: HOSPITALIST

## 2020-11-27 PROCEDURE — 84145 PROCALCITONIN (PCT): CPT | Performed by: HOSPITALIST

## 2020-11-27 PROCEDURE — 250N000009 HC RX 250: Performed by: HOSPITALIST

## 2020-11-27 PROCEDURE — 999N001017 HC STATISTIC GLUCOSE BY METER IP

## 2020-11-27 RX ADMIN — GUAIFENESIN AND CODEINE PHOSPHATE 5 ML: 10; 100 LIQUID ORAL at 08:22

## 2020-11-27 RX ADMIN — ACETAMINOPHEN 650 MG: 325 TABLET, FILM COATED ORAL at 16:42

## 2020-11-27 RX ADMIN — PRAVASTATIN SODIUM 40 MG: 20 TABLET ORAL at 21:15

## 2020-11-27 RX ADMIN — ACETAMINOPHEN 650 MG: 325 TABLET, FILM COATED ORAL at 21:15

## 2020-11-27 RX ADMIN — ENOXAPARIN SODIUM 40 MG: 40 INJECTION SUBCUTANEOUS at 21:14

## 2020-11-27 RX ADMIN — ENOXAPARIN SODIUM 40 MG: 40 INJECTION SUBCUTANEOUS at 08:26

## 2020-11-27 RX ADMIN — DEXAMETHASONE SODIUM PHOSPHATE 6 MG: 4 INJECTION, SOLUTION INTRAMUSCULAR; INTRAVENOUS at 08:23

## 2020-11-27 RX ADMIN — DOCUSATE SODIUM AND SENNOSIDES 2 TABLET: 8.6; 5 TABLET ORAL at 21:15

## 2020-11-27 RX ADMIN — FAMOTIDINE 20 MG: 20 TABLET ORAL at 21:15

## 2020-11-27 RX ADMIN — ALBUTEROL SULFATE 2 PUFF: 90 AEROSOL, METERED RESPIRATORY (INHALATION) at 15:53

## 2020-11-27 RX ADMIN — INSULIN ASPART 1 UNITS: 100 INJECTION, SOLUTION INTRAVENOUS; SUBCUTANEOUS at 13:08

## 2020-11-27 RX ADMIN — ALBUTEROL SULFATE 2 PUFF: 90 AEROSOL, METERED RESPIRATORY (INHALATION) at 09:01

## 2020-11-27 RX ADMIN — FAMOTIDINE 20 MG: 20 TABLET ORAL at 08:23

## 2020-11-27 RX ADMIN — ALBUTEROL SULFATE 2 PUFF: 90 AEROSOL, METERED RESPIRATORY (INHALATION) at 21:16

## 2020-11-27 RX ADMIN — REMDESIVIR 100 MG: 100 INJECTION, POWDER, LYOPHILIZED, FOR SOLUTION INTRAVENOUS at 17:38

## 2020-11-27 RX ADMIN — ALBUTEROL SULFATE 2 PUFF: 90 AEROSOL, METERED RESPIRATORY (INHALATION) at 12:35

## 2020-11-27 RX ADMIN — POLYETHYLENE GLYCOL 3350 17 G: 17 POWDER, FOR SOLUTION ORAL at 08:22

## 2020-11-27 RX ADMIN — DOCUSATE SODIUM AND SENNOSIDES 2 TABLET: 8.6; 5 TABLET ORAL at 08:23

## 2020-11-27 ASSESSMENT — ACTIVITIES OF DAILY LIVING (ADL)
ADLS_ACUITY_SCORE: 20
ADLS_ACUITY_SCORE: 21
ADLS_ACUITY_SCORE: 20
ADLS_ACUITY_SCORE: 21
ADLS_ACUITY_SCORE: 21
ADLS_ACUITY_SCORE: 20

## 2020-11-27 ASSESSMENT — MIFFLIN-ST. JEOR: SCORE: 1106.61

## 2020-11-27 NOTE — PROGRESS NOTES
[facility]    Hospitalist Progress Note      Assessment & Plan   Day Russell is a 60 year old female with past medical history of HTN, HLD, and GERD who was admitted on 11/23/2020 after presenting with shortness of breath and fevers for over 1 week and found to be hypoxic.  CTPA ruled out PE, but did show significant diffuse patchy groundglass opacities consistent with COVID-19 infection.  Reportedly she was diagnosed with COVID-19 on 11/19.       #Acute hypoxemic respiratory failure secondary to COVID-19 pneumonia: Presenting with 1 week of progressive shortness of breath and fevers.  Found to be hypoxic.  CTPA shows no PE, but shows significant diffuse groundglass opacities consistent with COVID-19.  Reportedly tested positive for COVID-19 on 11/19, results not available to me.  Low-grade temperatures here.  Elevated CRP.  -Patient seems clinically improved today.  Less tachypnea.  She feels symptomatically a little bit better.  She is also been able to tolerate high flow from BiPAP which is improved.  -I did add on a procalcitonin today.  Low suspicion for superimposed bacterial pneumonia given clinical improvement over the last 24 hours.  If procalcitonin is positive will start treatment for CAP.  -Continue IV Remdesevir day 5/5  -Continue IV Decadron 6 mg daily day 5/10  -Robitussin with codeine as needed  -Lovenox 40 mg twice daily for DVT prophylaxis  -Albuterol HFA if wheezing    #Chest pain: Suspect secondary to COVID-19 pneumonia.  No PE on CT.  Troponin negative x2.     #Hyperglycemia: No history of diabetes.  Secondary to steroids.  Sliding scale insulin as needed.     #HLD: Continue PTA pravastatin 40 mg at bedtime.    I did update her daughter today by phone    DVT Prophylaxis: Enoxaparin (Lovenox) SQ 40 mg twice daily  Code Status: Full Code  Dispo: Continue inpatient cares.    Simon Mon MD  Text Page    Interval History   No acute events overnight.  Patient able to be  weaned off BiPAP back to high flow.  Patient is tachypneic.  She feels symptomatically a bit better.  She denies any new or worsening chest pain.    -Data reviewed today: I reviewed all new labs and imaging results over the last 24 hours. .    Physical Exam   Temp: 98  F (36.7  C) Temp src: Oral BP: 116/76 Pulse: 91   Resp: 24 SpO2: 93 % O2 Device: High Flow Nasal Cannula (HFNC) Oxygen Delivery: 50 LPM  Vitals:    11/24/20 0650 11/26/20 0500 11/27/20 0400   Weight: 65.6 kg (144 lb 9.6 oz) 65 kg (143 lb 4.8 oz) 65.3 kg (143 lb 14.4 oz)     Vital Signs with Ranges  Temp:  [97.9  F (36.6  C)-98.9  F (37.2  C)] 98  F (36.7  C)  Pulse:  [] 91  Resp:  [24-40] 24  BP: (116-150)/() 116/76  FiO2 (%):  [60 %-80 %] 80 %  SpO2:  [82 %-97 %] 93 %  I/O last 3 completed shifts:  In: 420 [P.O.:170; I.V.:250]  Out: 1025 [Urine:1025]    Constitutional: High flow in place. Improved tachypnea today.    HEENT: Normocephalic. MMM, No elevation of JVD noted.   Respiratory: +improved Tachypnea. Coarse bilaterally. No wheezes..   Cardiovascular: Regular, no murmur  GI: BS+, NT, ND  Skin/Integumen: WWP, no rash. No edema  Neuro: CNII-XII intact. Moves all extremities. No tremor. A&Ox3.    Medications     - MEDICATION INSTRUCTIONS -       - MEDICATION INSTRUCTIONS -       - MEDICATION INSTRUCTIONS -       - MEDICATION INSTRUCTIONS -         albuterol  2 puff Inhalation 4x Daily     dexamethasone  6 mg Intravenous Q24H     enoxaparin ANTICOAGULANT  40 mg Subcutaneous BID     famotidine  20 mg Oral BID     insulin aspart  1-7 Units Subcutaneous TID AC     insulin aspart  1-5 Units Subcutaneous At Bedtime     polyethylene glycol  17 g Oral Daily     pravastatin  40 mg Oral At Bedtime     remdesivir  100 mg Intravenous Q24H     senna-docusate  1 tablet Oral BID    Or     senna-docusate  2 tablet Oral BID     sodium chloride (PF)  3 mL Intracatheter Q8H       Data   Recent Labs   Lab 11/27/20  1042 11/27/20  0844 11/26/20  0658  11/25/20  0605 11/24/20  0508 11/23/20  1331   WBC  --  16.0* 10.2 8.5 4.1 5.6   HGB  --  15.8* 14.0 13.3 13.5 14.5   MCV  --  96 99 98 99 98   PLT  --  305 335 311 215 212   INR 1.11  --   --   --  0.95 0.82*   NA  --  142 145* 144 143 139   POTASSIUM  --  3.6 3.8 3.7 4.2 3.4   CHLORIDE  --  113* 115* 115* 113* 110*   CO2  --  24 24 23 24 23   BUN  --  22 25 21 9 9   CR  --  0.58 0.55 0.60 0.44* 0.65   ANIONGAP  --  5 6 6 6 6   LEELA  --  8.3* 8.4* 8.6 7.9* 8.2*   GLC  --  91 108* 110* 132* 94   ALBUMIN  --   --   --  2.4* 2.5* 2.8*   PROTTOTAL  --   --   --  7.1 7.2 7.7   BILITOTAL  --   --   --  0.3 0.4 0.2   ALKPHOS  --   --   --  72 73 73   ALT  --   --   --  46 51* 50   AST  --   --   --  49* 70* 75*   TROPI  --   --   --   --  <0.015 <0.015       No results found for this or any previous visit (from the past 24 hour(s)).

## 2020-11-27 NOTE — PLAN OF CARE
ICU End of Shift Summary.  For vital signs and complete assessments, please see documentation flowsheets.     Pertinent assessments: Vitals stable on HFNC 50L and 70-80% FiO2. Lungs are diminished with occasional fine crackles. Complained of pain with cough, which was managed with prn medications. Afebrile. Adequate urine output. No BM.     Major Shift Events: Slept well and remained on HFNC.     Plan (Upcoming Events): Wean oxygen and encourage activity as tolerated.  Discharge/Transfer Needs: TBD    Bedside Shift Report Completed :   Bedside Safety Check Completed:

## 2020-11-27 NOTE — PROGRESS NOTES
Respiratory Care Note    Pt remains on HFNC 50LPM, @ 75-80% for PEEP and oxygen support. Bs diminished. RT will continue to assess and monitor.    Mike Graham, RT on 11/27/2020 at 6:03 AM

## 2020-11-27 NOTE — PLAN OF CARE
ICU End of Shift Summary.  For vital signs and complete assessments, please see documentation flowsheets.     Pertinent assessments: Pt is alert and oriented. Pt is laotian speaking. Using the  phone to communicate.   Major Shift Events: Pt has increased work of breathing this morning with RR up to 50's. Respiratory status improved with prn dose of ativan. Pt was able to come off of bipap this afternoon and on to HFNC. Pt still tachypneic but with rr in 30's. Pt does report that breathing feels better this afternoon compared to this morning. Pt is more comfortable on HFNC.   Plan (Upcoming Events): Continue to monitor. Continue to wean 02 as able. Increase activity as able.   Discharge/Transfer Needs: TBD    Bedside Shift Report Completed :   Bedside Safety Check Completed:

## 2020-11-28 LAB
ANION GAP SERPL CALCULATED.3IONS-SCNC: 3 MMOL/L (ref 3–14)
BASOPHILS # BLD AUTO: 0 10E9/L (ref 0–0.2)
BASOPHILS NFR BLD AUTO: 0.2 %
BUN SERPL-MCNC: 16 MG/DL (ref 7–30)
CALCIUM SERPL-MCNC: 8.4 MG/DL (ref 8.5–10.1)
CHLORIDE SERPL-SCNC: 111 MMOL/L (ref 94–109)
CO2 SERPL-SCNC: 26 MMOL/L (ref 20–32)
CREAT SERPL-MCNC: 0.54 MG/DL (ref 0.52–1.04)
CRP SERPL-MCNC: 38.4 MG/L (ref 0–8)
D DIMER PPP FEU-MCNC: 0.5 UG/ML FEU (ref 0–0.5)
DIFFERENTIAL METHOD BLD: ABNORMAL
EOSINOPHIL # BLD AUTO: 0.1 10E9/L (ref 0–0.7)
EOSINOPHIL NFR BLD AUTO: 0.4 %
ERYTHROCYTE [DISTWIDTH] IN BLOOD BY AUTOMATED COUNT: 12.6 % (ref 10–15)
FIBRINOGEN PPP-MCNC: 607 MG/DL (ref 200–420)
GFR SERPL CREATININE-BSD FRML MDRD: >90 ML/MIN/{1.73_M2}
GLUCOSE BLDC GLUCOMTR-MCNC: 114 MG/DL (ref 70–99)
GLUCOSE BLDC GLUCOMTR-MCNC: 181 MG/DL (ref 70–99)
GLUCOSE BLDC GLUCOMTR-MCNC: 211 MG/DL (ref 70–99)
GLUCOSE BLDC GLUCOMTR-MCNC: 91 MG/DL (ref 70–99)
GLUCOSE BLDC GLUCOMTR-MCNC: 98 MG/DL (ref 70–99)
GLUCOSE SERPL-MCNC: 93 MG/DL (ref 70–99)
HCT VFR BLD AUTO: 45.1 % (ref 35–47)
HGB BLD-MCNC: 14.4 G/DL (ref 11.7–15.7)
IMM GRANULOCYTES # BLD: 0.3 10E9/L (ref 0–0.4)
IMM GRANULOCYTES NFR BLD: 2.2 %
INR PPP: 1.12 (ref 0.86–1.14)
LDH SERPL L TO P-CCNC: 413 U/L (ref 81–234)
LYMPHOCYTES # BLD AUTO: 2.2 10E9/L (ref 0.8–5.3)
LYMPHOCYTES NFR BLD AUTO: 15.2 %
MCH RBC QN AUTO: 31.3 PG (ref 26.5–33)
MCHC RBC AUTO-ENTMCNC: 31.9 G/DL (ref 31.5–36.5)
MCV RBC AUTO: 98 FL (ref 78–100)
MONOCYTES # BLD AUTO: 1.4 10E9/L (ref 0–1.3)
MONOCYTES NFR BLD AUTO: 10 %
NEUTROPHILS # BLD AUTO: 10.4 10E9/L (ref 1.6–8.3)
NEUTROPHILS NFR BLD AUTO: 72 %
NRBC # BLD AUTO: 0 10*3/UL
NRBC BLD AUTO-RTO: 0 /100
PLATELET # BLD AUTO: 355 10E9/L (ref 150–450)
POTASSIUM SERPL-SCNC: 3.8 MMOL/L (ref 3.4–5.3)
RBC # BLD AUTO: 4.6 10E12/L (ref 3.8–5.2)
RETICS # AUTO: 43.7 10E9/L (ref 25–95)
RETICS/RBC NFR AUTO: 1 % (ref 0.5–2)
SODIUM SERPL-SCNC: 140 MMOL/L (ref 133–144)
WBC # BLD AUTO: 14.4 10E9/L (ref 4–11)

## 2020-11-28 PROCEDURE — 999N000215 HC STATISTIC HFNC ADULT NON-CPAP

## 2020-11-28 PROCEDURE — 200N000001 HC R&B ICU

## 2020-11-28 PROCEDURE — 85379 FIBRIN DEGRADATION QUANT: CPT | Performed by: HOSPITALIST

## 2020-11-28 PROCEDURE — 999N001017 HC STATISTIC GLUCOSE BY METER IP

## 2020-11-28 PROCEDURE — 85384 FIBRINOGEN ACTIVITY: CPT | Performed by: HOSPITALIST

## 2020-11-28 PROCEDURE — 85610 PROTHROMBIN TIME: CPT | Performed by: HOSPITALIST

## 2020-11-28 PROCEDURE — 85025 COMPLETE CBC W/AUTO DIFF WBC: CPT | Performed by: HOSPITALIST

## 2020-11-28 PROCEDURE — 999N000157 HC STATISTIC RCP TIME EA 10 MIN

## 2020-11-28 PROCEDURE — 83615 LACTATE (LD) (LDH) ENZYME: CPT | Performed by: HOSPITALIST

## 2020-11-28 PROCEDURE — 99233 SBSQ HOSP IP/OBS HIGH 50: CPT | Performed by: HOSPITALIST

## 2020-11-28 PROCEDURE — 80048 BASIC METABOLIC PNL TOTAL CA: CPT | Performed by: HOSPITALIST

## 2020-11-28 PROCEDURE — 86140 C-REACTIVE PROTEIN: CPT | Performed by: HOSPITALIST

## 2020-11-28 PROCEDURE — 85045 AUTOMATED RETICULOCYTE COUNT: CPT | Performed by: HOSPITALIST

## 2020-11-28 PROCEDURE — 120N000013 HC R&B IMCU

## 2020-11-28 PROCEDURE — 250N000013 HC RX MED GY IP 250 OP 250 PS 637: Performed by: HOSPITALIST

## 2020-11-28 PROCEDURE — 36415 COLL VENOUS BLD VENIPUNCTURE: CPT | Performed by: HOSPITALIST

## 2020-11-28 PROCEDURE — 250N000011 HC RX IP 250 OP 636: Performed by: HOSPITALIST

## 2020-11-28 PROCEDURE — 94640 AIRWAY INHALATION TREATMENT: CPT | Mod: 76

## 2020-11-28 PROCEDURE — 94640 AIRWAY INHALATION TREATMENT: CPT

## 2020-11-28 RX ADMIN — GUAIFENESIN AND CODEINE PHOSPHATE 5 ML: 10; 100 LIQUID ORAL at 21:04

## 2020-11-28 RX ADMIN — ENOXAPARIN SODIUM 40 MG: 40 INJECTION SUBCUTANEOUS at 21:04

## 2020-11-28 RX ADMIN — GUAIFENESIN AND CODEINE PHOSPHATE 5 ML: 10; 100 LIQUID ORAL at 09:09

## 2020-11-28 RX ADMIN — ALBUTEROL SULFATE 2 PUFF: 90 AEROSOL, METERED RESPIRATORY (INHALATION) at 14:27

## 2020-11-28 RX ADMIN — ENOXAPARIN SODIUM 40 MG: 40 INJECTION SUBCUTANEOUS at 08:05

## 2020-11-28 RX ADMIN — DEXAMETHASONE SODIUM PHOSPHATE 6 MG: 4 INJECTION, SOLUTION INTRAMUSCULAR; INTRAVENOUS at 08:05

## 2020-11-28 RX ADMIN — OXYCODONE HYDROCHLORIDE 5 MG: 5 TABLET ORAL at 09:09

## 2020-11-28 RX ADMIN — FAMOTIDINE 20 MG: 20 TABLET ORAL at 08:03

## 2020-11-28 RX ADMIN — ALBUTEROL SULFATE 2 PUFF: 90 AEROSOL, METERED RESPIRATORY (INHALATION) at 21:03

## 2020-11-28 RX ADMIN — ALBUTEROL SULFATE 2 PUFF: 90 AEROSOL, METERED RESPIRATORY (INHALATION) at 08:07

## 2020-11-28 RX ADMIN — FAMOTIDINE 20 MG: 20 TABLET ORAL at 21:04

## 2020-11-28 ASSESSMENT — ACTIVITIES OF DAILY LIVING (ADL)
ADLS_ACUITY_SCORE: 20
ADLS_ACUITY_SCORE: 20
ADLS_ACUITY_SCORE: 24
ADLS_ACUITY_SCORE: 20
ADLS_ACUITY_SCORE: 24
ADLS_ACUITY_SCORE: 24

## 2020-11-28 ASSESSMENT — MIFFLIN-ST. JEOR: SCORE: 1121.88

## 2020-11-28 NOTE — PROGRESS NOTES
The HFNC was applied to pt @ 40LPM and 70% for PEEP therapy.     Skin integrity is intact    Skin interventions N/A    Temp: 97.8  F (36.6  C) Temp src: Oral BP: 105/48 Pulse: 63   Resp: (!) 32 SpO2: (!) 89 % O2 Device: High Flow Nasal Cannula (HFNC) Oxygen Delivery: 40 LPM     Will continue to monitor, no complications noted at this time.    Liz Medrano, RT

## 2020-11-28 NOTE — PROGRESS NOTES
[facility]    Hospitalist Progress Note      Assessment & Plan   Day Russell is a 60 year old female with past medical history of HTN, HLD, and GERD who was admitted on 11/23/2020 after presenting with shortness of breath and fevers for over 1 week and found to be hypoxic.  CTPA ruled out PE, but did show significant diffuse patchy groundglass opacities consistent with COVID-19 infection.  Reportedly she was diagnosed with COVID-19 on 11/19.       #Acute hypoxemic respiratory failure secondary to COVID-19 pneumonia: Presenting with 1 week of progressive shortness of breath and fevers.  Found to be hypoxic.  CTPA shows no PE, but shows significant diffuse groundglass opacities consistent with COVID-19.  Reportedly tested positive for COVID-19 on 11/19, results not available to me.  Low-grade temperatures here.  Elevated CRP.  -Patient with slow clinical improvement.  Intermittently on high flow and BiPAP still.  -Procalcitonin is negative.    -Completed course of IV Remdesevir on 11/27  -Continue IV Decadron 6 mg daily day 6/10  -Robitussin with codeine as needed  -Lovenox 40 mg twice daily for DVT prophylaxis  -Albuterol HFA if wheezing     #Chest pain: Suspect secondary to COVID-19 pneumonia.  No PE on CT.  Troponin negative x2.     #Hyperglycemia: No history of diabetes.  Secondary to steroids.  Sliding scale insulin as needed.     #HLD: Continue PTA pravastatin 40 mg at bedtime.     DVT Prophylaxis: Enoxaparin (Lovenox) SQ 40 mg twice daily  Code Status: Full Code  Dispo: Continue inpatient cares.    Simon Mon MD  Text Page    Interval History   No acute events overnight.  Patient remains on high flow intermittently with BiPAP.  Patient still feels fatigued but thinks breathing is a bit better.  Still with cough.  No worsening chest pain.  Less anxious.    -Data reviewed today: I reviewed all new labs and imaging results over the last 24 hours.    Physical Exam   Temp: 99.4  F (37.4   C) Temp src: Axillary BP: 123/67 Pulse: 85   Resp: (!) 44 SpO2: 95 % O2 Device: High Flow Nasal Cannula (HFNC) Oxygen Delivery: 45 LPM  Vitals:    11/26/20 0500 11/27/20 0400 11/28/20 0515   Weight: 65 kg (143 lb 4.8 oz) 65.3 kg (143 lb 14.4 oz) 66.8 kg (147 lb 4.3 oz)     Vital Signs with Ranges  Temp:  [97.8  F (36.6  C)-99.4  F (37.4  C)] 99.4  F (37.4  C)  Pulse:  [63-97] 85  Resp:  [30-48] 44  BP: ()/(40-82) 123/67  FiO2 (%):  [70 %-90 %] 90 %  SpO2:  [87 %-99 %] 95 %  I/O last 3 completed shifts:  In: 995 [P.O.:745; I.V.:250]  Out: -     Constitutional: High flow in place. Improved tachypnea.   HEENT: Normocephalic. MMM, No elevation of JVD noted.   Respiratory: +improved Tachypnea. Coarse bilaterally. No wheezes..   Cardiovascular: Regular, no murmur  GI: BS+, NT, ND  Skin/Integumen: WWP, no rash. No edema  Neuro: CNII-XII intact. Moves all extremities. No tremor. A&Ox3.    Medications     - MEDICATION INSTRUCTIONS -       - MEDICATION INSTRUCTIONS -       - MEDICATION INSTRUCTIONS -       - MEDICATION INSTRUCTIONS -         albuterol  2 puff Inhalation 4x Daily     dexamethasone  6 mg Intravenous Q24H     enoxaparin ANTICOAGULANT  40 mg Subcutaneous BID     famotidine  20 mg Oral BID     insulin aspart  1-7 Units Subcutaneous TID AC     insulin aspart  1-5 Units Subcutaneous At Bedtime     polyethylene glycol  17 g Oral Daily     pravastatin  40 mg Oral At Bedtime     senna-docusate  1 tablet Oral BID    Or     senna-docusate  2 tablet Oral BID     sodium chloride (PF)  3 mL Intracatheter Q8H       Data   Recent Labs   Lab 11/28/20  0657 11/27/20  1042 11/27/20  0844 11/26/20  0658 11/25/20  0605 11/24/20  0508 11/23/20  1331   WBC 14.4*  --  16.0* 10.2 8.5 4.1 5.6   HGB 14.4  --  15.8* 14.0 13.3 13.5 14.5   MCV 98  --  96 99 98 99 98     --  305 335 311 215 212   INR 1.12 1.11  --   --   --  0.95 0.82*     --  142 145* 144 143 139   POTASSIUM 3.8  --  3.6 3.8 3.7 4.2 3.4   CHLORIDE 111*   --  113* 115* 115* 113* 110*   CO2 26  --  24 24 23 24 23   BUN 16  --  22 25 21 9 9   CR 0.54  --  0.58 0.55 0.60 0.44* 0.65   ANIONGAP 3  --  5 6 6 6 6   LEELA 8.4*  --  8.3* 8.4* 8.6 7.9* 8.2*   GLC 93  --  91 108* 110* 132* 94   ALBUMIN  --   --   --   --  2.4* 2.5* 2.8*   PROTTOTAL  --   --   --   --  7.1 7.2 7.7   BILITOTAL  --   --   --   --  0.3 0.4 0.2   ALKPHOS  --   --   --   --  72 73 73   ALT  --   --   --   --  46 51* 50   AST  --   --   --   --  49* 70* 75*   TROPI  --   --   --   --   --  <0.015 <0.015       No results found for this or any previous visit (from the past 24 hour(s)).

## 2020-11-28 NOTE — PLAN OF CARE
ICU End of Shift Summary.  For vital signs and complete assessments, please see documentation flowsheets.     Pertinent assessments: Pt is alert and oriented. Using  phone to communicate. Pt reports breathing has improved but is feeling more tired today.   Major Shift Events: Up to chair for most of the day today. Able come down on 02. Paged Dr. Mon at 15:00 to discuss changing status out of ICU to IMC. MD said he would like pt to remain ICU status for now and will re-evaluate in the morning.   Plan (Upcoming Events): Wean 02 as able.   Discharge/Transfer Needs: TBD.     Bedside Shift Report Completed :   Bedside Safety Check Completed:

## 2020-11-28 NOTE — PLAN OF CARE
ICU End of Shift Summary.  For vital signs and complete assessments, please see documentation flowsheets.     Pertinent assessments: Portuguese speaking COVID positive patient in ICU, on HFNC and BiPAP. Alert & oriented (assessment performed with  phone at bedside). NSR, no ectopy, BP stable. Reports of midgastric pain managed with tylenol and scheduled meds. Low grade temp (99). Faint crackles in bases bilaterally. Large soft /loose incontinent stool x1. Incontinent of urine around purewick. Patient repositions self frequently in bed, assists with large boost.   Major Shift Events: No major events overnight. Patient desaturated into low 80's% approx 5am, placed on BiPAP 14/7 75% from HFNC.  Plan (Upcoming Events): Continue to wean from BiPAP & HFNC as able.   Discharge/Transfer Needs: tbd    Bedside Shift Report Completed : y  Bedside Safety Check Completed: y

## 2020-11-29 LAB
ANION GAP SERPL CALCULATED.3IONS-SCNC: 4 MMOL/L (ref 3–14)
BASOPHILS # BLD AUTO: 0 10E9/L (ref 0–0.2)
BASOPHILS NFR BLD AUTO: 0.2 %
BUN SERPL-MCNC: 16 MG/DL (ref 7–30)
CALCIUM SERPL-MCNC: 8.5 MG/DL (ref 8.5–10.1)
CHLORIDE SERPL-SCNC: 109 MMOL/L (ref 94–109)
CO2 SERPL-SCNC: 26 MMOL/L (ref 20–32)
CREAT SERPL-MCNC: 0.5 MG/DL (ref 0.52–1.04)
CRP SERPL-MCNC: 54.5 MG/L (ref 0–8)
D DIMER PPP FEU-MCNC: 0.5 UG/ML FEU (ref 0–0.5)
DIFFERENTIAL METHOD BLD: ABNORMAL
EOSINOPHIL # BLD AUTO: 0.2 10E9/L (ref 0–0.7)
EOSINOPHIL NFR BLD AUTO: 0.9 %
ERYTHROCYTE [DISTWIDTH] IN BLOOD BY AUTOMATED COUNT: 12.4 % (ref 10–15)
FIBRINOGEN PPP-MCNC: 622 MG/DL (ref 200–420)
GFR SERPL CREATININE-BSD FRML MDRD: >90 ML/MIN/{1.73_M2}
GLUCOSE BLDC GLUCOMTR-MCNC: 144 MG/DL (ref 70–99)
GLUCOSE BLDC GLUCOMTR-MCNC: 156 MG/DL (ref 70–99)
GLUCOSE BLDC GLUCOMTR-MCNC: 215 MG/DL (ref 70–99)
GLUCOSE SERPL-MCNC: 100 MG/DL (ref 70–99)
HCT VFR BLD AUTO: 43.6 % (ref 35–47)
HGB BLD-MCNC: 14.5 G/DL (ref 11.7–15.7)
IMM GRANULOCYTES # BLD: 0.4 10E9/L (ref 0–0.4)
IMM GRANULOCYTES NFR BLD: 2.1 %
INR PPP: 1.1 (ref 0.86–1.14)
LDH SERPL L TO P-CCNC: 368 U/L (ref 81–234)
LYMPHOCYTES # BLD AUTO: 2.2 10E9/L (ref 0.8–5.3)
LYMPHOCYTES NFR BLD AUTO: 11.3 %
MCH RBC QN AUTO: 32.2 PG (ref 26.5–33)
MCHC RBC AUTO-ENTMCNC: 33.3 G/DL (ref 31.5–36.5)
MCV RBC AUTO: 97 FL (ref 78–100)
MONOCYTES # BLD AUTO: 1.4 10E9/L (ref 0–1.3)
MONOCYTES NFR BLD AUTO: 7.3 %
NEUTROPHILS # BLD AUTO: 14.9 10E9/L (ref 1.6–8.3)
NEUTROPHILS NFR BLD AUTO: 78.2 %
NRBC # BLD AUTO: 0 10*3/UL
NRBC BLD AUTO-RTO: 0 /100
PLATELET # BLD AUTO: 393 10E9/L (ref 150–450)
POTASSIUM SERPL-SCNC: 3.6 MMOL/L (ref 3.4–5.3)
RBC # BLD AUTO: 4.51 10E12/L (ref 3.8–5.2)
RETICS # AUTO: 71.3 10E9/L (ref 25–95)
RETICS/RBC NFR AUTO: 1.6 % (ref 0.5–2)
SODIUM SERPL-SCNC: 139 MMOL/L (ref 133–144)
WBC # BLD AUTO: 19.1 10E9/L (ref 4–11)

## 2020-11-29 PROCEDURE — 250N000011 HC RX IP 250 OP 636: Performed by: HOSPITALIST

## 2020-11-29 PROCEDURE — 85384 FIBRINOGEN ACTIVITY: CPT | Performed by: HOSPITALIST

## 2020-11-29 PROCEDURE — 120N000001 HC R&B MED SURG/OB

## 2020-11-29 PROCEDURE — 999N001017 HC STATISTIC GLUCOSE BY METER IP

## 2020-11-29 PROCEDURE — 85045 AUTOMATED RETICULOCYTE COUNT: CPT | Performed by: HOSPITALIST

## 2020-11-29 PROCEDURE — 86140 C-REACTIVE PROTEIN: CPT | Performed by: HOSPITALIST

## 2020-11-29 PROCEDURE — 250N000013 HC RX MED GY IP 250 OP 250 PS 637: Performed by: HOSPITALIST

## 2020-11-29 PROCEDURE — 85610 PROTHROMBIN TIME: CPT | Performed by: HOSPITALIST

## 2020-11-29 PROCEDURE — 999N000215 HC STATISTIC HFNC ADULT NON-CPAP

## 2020-11-29 PROCEDURE — 120N000013 HC R&B IMCU

## 2020-11-29 PROCEDURE — 250N000013 HC RX MED GY IP 250 OP 250 PS 637: Performed by: INTERNAL MEDICINE

## 2020-11-29 PROCEDURE — 94640 AIRWAY INHALATION TREATMENT: CPT | Mod: 76

## 2020-11-29 PROCEDURE — 85025 COMPLETE CBC W/AUTO DIFF WBC: CPT | Performed by: HOSPITALIST

## 2020-11-29 PROCEDURE — 83615 LACTATE (LD) (LDH) ENZYME: CPT | Performed by: HOSPITALIST

## 2020-11-29 PROCEDURE — 80048 BASIC METABOLIC PNL TOTAL CA: CPT | Performed by: HOSPITALIST

## 2020-11-29 PROCEDURE — 85379 FIBRIN DEGRADATION QUANT: CPT | Performed by: HOSPITALIST

## 2020-11-29 PROCEDURE — 99233 SBSQ HOSP IP/OBS HIGH 50: CPT | Performed by: HOSPITALIST

## 2020-11-29 PROCEDURE — 999N000157 HC STATISTIC RCP TIME EA 10 MIN

## 2020-11-29 PROCEDURE — 94640 AIRWAY INHALATION TREATMENT: CPT

## 2020-11-29 PROCEDURE — 36415 COLL VENOUS BLD VENIPUNCTURE: CPT | Performed by: HOSPITALIST

## 2020-11-29 RX ADMIN — ENOXAPARIN SODIUM 30 MG: 30 INJECTION SUBCUTANEOUS at 08:03

## 2020-11-29 RX ADMIN — ALBUTEROL SULFATE 2 PUFF: 90 AEROSOL, METERED RESPIRATORY (INHALATION) at 17:09

## 2020-11-29 RX ADMIN — ACETAMINOPHEN 650 MG: 325 TABLET, FILM COATED ORAL at 09:01

## 2020-11-29 RX ADMIN — INSULIN ASPART 2 UNITS: 100 INJECTION, SOLUTION INTRAVENOUS; SUBCUTANEOUS at 17:22

## 2020-11-29 RX ADMIN — FAMOTIDINE 20 MG: 20 TABLET ORAL at 08:03

## 2020-11-29 RX ADMIN — GUAIFENESIN AND CODEINE PHOSPHATE 5 ML: 10; 100 LIQUID ORAL at 14:37

## 2020-11-29 RX ADMIN — ALBUTEROL SULFATE 2 PUFF: 90 AEROSOL, METERED RESPIRATORY (INHALATION) at 08:29

## 2020-11-29 RX ADMIN — PRAVASTATIN SODIUM 40 MG: 20 TABLET ORAL at 21:50

## 2020-11-29 RX ADMIN — DOCUSATE SODIUM AND SENNOSIDES 2 TABLET: 8.6; 5 TABLET ORAL at 19:52

## 2020-11-29 RX ADMIN — OXYCODONE HYDROCHLORIDE 5 MG: 5 TABLET ORAL at 18:03

## 2020-11-29 RX ADMIN — ALBUTEROL SULFATE 2 PUFF: 90 AEROSOL, METERED RESPIRATORY (INHALATION) at 12:40

## 2020-11-29 RX ADMIN — ENOXAPARIN SODIUM 30 MG: 30 INJECTION SUBCUTANEOUS at 19:52

## 2020-11-29 RX ADMIN — INSULIN ASPART 1 UNITS: 100 INJECTION, SOLUTION INTRAVENOUS; SUBCUTANEOUS at 11:43

## 2020-11-29 RX ADMIN — FAMOTIDINE 20 MG: 20 TABLET ORAL at 19:52

## 2020-11-29 RX ADMIN — PRAVASTATIN SODIUM 40 MG: 20 TABLET ORAL at 00:05

## 2020-11-29 RX ADMIN — DEXAMETHASONE SODIUM PHOSPHATE 6 MG: 4 INJECTION, SOLUTION INTRAMUSCULAR; INTRAVENOUS at 08:03

## 2020-11-29 RX ADMIN — ALBUTEROL SULFATE 2 PUFF: 90 AEROSOL, METERED RESPIRATORY (INHALATION) at 21:46

## 2020-11-29 RX ADMIN — OXYCODONE HYDROCHLORIDE 5 MG: 5 TABLET ORAL at 03:31

## 2020-11-29 ASSESSMENT — ACTIVITIES OF DAILY LIVING (ADL)
ADLS_ACUITY_SCORE: 25
ADLS_ACUITY_SCORE: 20
ADLS_ACUITY_SCORE: 25
ADLS_ACUITY_SCORE: 24
ADLS_ACUITY_SCORE: 24
ADLS_ACUITY_SCORE: 25

## 2020-11-29 ASSESSMENT — MIFFLIN-ST. JEOR: SCORE: 1114.88
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
## 2020-11-29 NOTE — PLAN OF CARE
ICU End of Shift Summary.  For vital signs and complete assessments, please see documentation flowsheets.     Pertinent assessments: VSS, lungs diminished, good appetite  Major Shift Events: HFNC 80% FiO2 45LPM  93%, up to chair this am, sanjana patent  Plan (Upcoming Events): wean o2, PT  Discharge/Transfer Needs: tbd    Bedside Shift Report Completed : y  Bedside Safety Check Completed:  y     Report given to Aruna and pt transferred to [facility].

## 2020-11-29 NOTE — PLAN OF CARE
ICU End of Shift Summary.  For vital signs and complete assessments, please see documentation flowsheets.     Pertinent assessments: VSS except on HFNC/BIPAP. Alert and oriented. Pain in upper abdomen from coughing. Took oxy with relief.  Major Shift Events: Taken off BIPAP this AM and switched to HFNC 40L 90%. Have been able to wean fiO2 this shift.  Plan (Upcoming Events): Continue to monitor respiratory status and wean as tolerated  Discharge/Transfer Needs: TBD    Bedside Shift Report Completed : Yes  Bedside Safety Check Completed: Yes

## 2020-11-29 NOTE — PROGRESS NOTES
[facility]    Hospitalist Progress Note      Assessment & Plan   Day Russell is a 60 year old female with past medical history of HTN, HLD, and GERD who was admitted on 11/23/2020 after presenting with shortness of breath and fevers for over 1 week and found to be hypoxic.  CTPA ruled out PE, but did show significant diffuse patchy groundglass opacities consistent with COVID-19 infection.  Reportedly she was diagnosed with COVID-19 on 11/19.       #Acute hypoxemic respiratory failure secondary to COVID-19 pneumonia: Presenting with 1 week of progressive shortness of breath and fevers.  Found to be hypoxic.  CTPA shows no PE, but shows significant diffuse groundglass opacities consistent with COVID-19.  Reportedly tested positive for COVID-19 on 11/19, results not available to me.  Low-grade temperatures here.  Elevated CRP.  -Patient with slow clinical improvement.  Intermittently on high flow and BiPAP still.  -Procalcitonin is negative.    -Completed course of IV Remdesevir on 11/27  -Continue IV Decadron 6 mg daily day 7/10  -Robitussin with codeine as needed  -Lovenox 30 mg twice daily for DVT prophylaxis  -Albuterol HFA if wheezing     #Chest pain: Suspect secondary to COVID-19 pneumonia.  No PE on CT.  Troponin negative x2.     #Hyperglycemia: No history of diabetes.  Secondary to steroids.  Sliding scale insulin as needed.     #HLD: Continue PTA pravastatin 40 mg at bedtime.     DVT Prophylaxis: Enoxaparin (Lovenox) SQ 40 mg twice daily  Code Status: Full Code  Dispo: Continue inpatient cares.    Simon Mon MD  Text Page    Interval History   No acute events. Remains on high flow/bipap.  No new CP. No fevers.  Feels generally weak.     -Data reviewed today: I reviewed all new labs and imaging results over the last 24 hours.     Physical Exam   Temp: 98.6  F (37  C) Temp src: Oral BP: (!) 85/66 Pulse: 103   Resp: (!) 40 SpO2: 92 % O2 Device: High Flow Nasal Cannula (HFNC) Oxygen  Delivery: 50 LPM  Vitals:    11/27/20 0400 11/28/20 0515 11/29/20 0500   Weight: 65.3 kg (143 lb 14.4 oz) 66.8 kg (147 lb 4.3 oz) 66.1 kg (145 lb 11.6 oz)     Vital Signs with Ranges  Temp:  [98.4  F (36.9  C)-99  F (37.2  C)] 98.6  F (37  C)  Pulse:  [] 103  Resp:  [28-42] 40  BP: ()/(63-76) 85/66  FiO2 (%):  [75 %-100 %] 90 %  SpO2:  [89 %-96 %] 92 %  I/O last 3 completed shifts:  In: 966 [P.O.:960; I.V.:6]  Out: 1400 [Urine:1400]    Constitutional: High flow in place. Improved tachypnea.   HEENT: Normocephalic. MMM, No elevation of JVD noted.   Respiratory: +improved Tachypnea. Coarse bilaterally. No wheezes..   Cardiovascular: Regular, no murmur  GI: BS+, NT, ND  Skin/Integumen: WWP, no rash. No edema  Neuro: CNII-XII intact. Moves all extremities. No tremor. A&Ox3.    Medications     - MEDICATION INSTRUCTIONS -       - MEDICATION INSTRUCTIONS -       - MEDICATION INSTRUCTIONS -       - MEDICATION INSTRUCTIONS -         albuterol  2 puff Inhalation 4x Daily     dexamethasone  6 mg Intravenous Q24H     enoxaparin ANTICOAGULANT  30 mg Subcutaneous BID     famotidine  20 mg Oral BID     insulin aspart  1-7 Units Subcutaneous TID AC     insulin aspart  1-5 Units Subcutaneous At Bedtime     polyethylene glycol  17 g Oral Daily     pravastatin  40 mg Oral At Bedtime     senna-docusate  1 tablet Oral BID    Or     senna-docusate  2 tablet Oral BID     sodium chloride (PF)  3 mL Intracatheter Q8H       Data   Recent Labs   Lab 11/29/20  0700 11/28/20  0657 11/27/20  1042 11/27/20  0844 11/25/20  0605 11/25/20  0605 11/24/20  0508 11/23/20  1331   WBC 19.1* 14.4*  --  16.0*   < > 8.5 4.1 5.6   HGB 14.5 14.4  --  15.8*   < > 13.3 13.5 14.5   MCV 97 98  --  96   < > 98 99 98    355  --  305   < > 311 215 212   INR 1.10 1.12 1.11  --   --   --  0.95 0.82*    140  --  142   < > 144 143 139   POTASSIUM 3.6 3.8  --  3.6   < > 3.7 4.2 3.4   CHLORIDE 109 111*  --  113*   < > 115* 113* 110*   CO2 26 26   --  24   < > 23 24 23   BUN 16 16  --  22   < > 21 9 9   CR 0.50* 0.54  --  0.58   < > 0.60 0.44* 0.65   ANIONGAP 4 3  --  5   < > 6 6 6   LEELA 8.5 8.4*  --  8.3*   < > 8.6 7.9* 8.2*   * 93  --  91   < > 110* 132* 94   ALBUMIN  --   --   --   --   --  2.4* 2.5* 2.8*   PROTTOTAL  --   --   --   --   --  7.1 7.2 7.7   BILITOTAL  --   --   --   --   --  0.3 0.4 0.2   ALKPHOS  --   --   --   --   --  72 73 73   ALT  --   --   --   --   --  46 51* 50   AST  --   --   --   --   --  49* 70* 75*   TROPI  --   --   --   --   --   --  <0.015 <0.015    < > = values in this interval not displayed.       No results found for this or any previous visit (from the past 24 hour(s)).

## 2020-11-30 ENCOUNTER — APPOINTMENT (OUTPATIENT)
Dept: PHYSICAL THERAPY | Facility: CLINIC | Age: 60
End: 2020-11-30
Attending: HOSPITALIST
Payer: COMMERCIAL

## 2020-11-30 LAB
ANION GAP SERPL CALCULATED.3IONS-SCNC: 3 MMOL/L (ref 3–14)
BASOPHILS # BLD AUTO: 0 10E9/L (ref 0–0.2)
BASOPHILS NFR BLD AUTO: 0.2 %
BUN SERPL-MCNC: 17 MG/DL (ref 7–30)
CALCIUM SERPL-MCNC: 8.4 MG/DL (ref 8.5–10.1)
CHLORIDE SERPL-SCNC: 108 MMOL/L (ref 94–109)
CO2 SERPL-SCNC: 27 MMOL/L (ref 20–32)
CREAT SERPL-MCNC: 0.52 MG/DL (ref 0.52–1.04)
CRP SERPL-MCNC: 51.4 MG/L (ref 0–8)
D DIMER PPP FEU-MCNC: 0.6 UG/ML FEU (ref 0–0.5)
DIFFERENTIAL METHOD BLD: ABNORMAL
EOSINOPHIL # BLD AUTO: 0.2 10E9/L (ref 0–0.7)
EOSINOPHIL NFR BLD AUTO: 1.1 %
ERYTHROCYTE [DISTWIDTH] IN BLOOD BY AUTOMATED COUNT: 12.4 % (ref 10–15)
FIBRINOGEN PPP-MCNC: 743 MG/DL (ref 200–420)
GFR SERPL CREATININE-BSD FRML MDRD: >90 ML/MIN/{1.73_M2}
GLUCOSE BLDC GLUCOMTR-MCNC: 128 MG/DL (ref 70–99)
GLUCOSE BLDC GLUCOMTR-MCNC: 129 MG/DL (ref 70–99)
GLUCOSE BLDC GLUCOMTR-MCNC: 137 MG/DL (ref 70–99)
GLUCOSE BLDC GLUCOMTR-MCNC: 142 MG/DL (ref 70–99)
GLUCOSE SERPL-MCNC: 98 MG/DL (ref 70–99)
HCT VFR BLD AUTO: 43.6 % (ref 35–47)
HGB BLD-MCNC: 14.4 G/DL (ref 11.7–15.7)
IMM GRANULOCYTES # BLD: 0.4 10E9/L (ref 0–0.4)
IMM GRANULOCYTES NFR BLD: 1.9 %
INR PPP: 1.08 (ref 0.86–1.14)
INTERPRETATION ECG - MUSE: NORMAL
LDH SERPL L TO P-CCNC: 339 U/L (ref 81–234)
LYMPHOCYTES # BLD AUTO: 2 10E9/L (ref 0.8–5.3)
LYMPHOCYTES NFR BLD AUTO: 10.4 %
MCH RBC QN AUTO: 32.1 PG (ref 26.5–33)
MCHC RBC AUTO-ENTMCNC: 33 G/DL (ref 31.5–36.5)
MCV RBC AUTO: 97 FL (ref 78–100)
MONOCYTES # BLD AUTO: 1.2 10E9/L (ref 0–1.3)
MONOCYTES NFR BLD AUTO: 6.6 %
NEUTROPHILS # BLD AUTO: 15 10E9/L (ref 1.6–8.3)
NEUTROPHILS NFR BLD AUTO: 79.8 %
NRBC # BLD AUTO: 0 10*3/UL
NRBC BLD AUTO-RTO: 0 /100
PLATELET # BLD AUTO: 424 10E9/L (ref 150–450)
POTASSIUM SERPL-SCNC: 3.8 MMOL/L (ref 3.4–5.3)
RBC # BLD AUTO: 4.49 10E12/L (ref 3.8–5.2)
RETICS # AUTO: 76.3 10E9/L (ref 25–95)
RETICS/RBC NFR AUTO: 1.7 % (ref 0.5–2)
SODIUM SERPL-SCNC: 138 MMOL/L (ref 133–144)
WBC # BLD AUTO: 18.8 10E9/L (ref 4–11)

## 2020-11-30 PROCEDURE — 94640 AIRWAY INHALATION TREATMENT: CPT | Mod: 76

## 2020-11-30 PROCEDURE — 250N000013 HC RX MED GY IP 250 OP 250 PS 637: Performed by: HOSPITALIST

## 2020-11-30 PROCEDURE — 86140 C-REACTIVE PROTEIN: CPT | Performed by: HOSPITALIST

## 2020-11-30 PROCEDURE — 94640 AIRWAY INHALATION TREATMENT: CPT

## 2020-11-30 PROCEDURE — 97530 THERAPEUTIC ACTIVITIES: CPT | Mod: GP | Performed by: PHYSICAL THERAPIST

## 2020-11-30 PROCEDURE — 999N000215 HC STATISTIC HFNC ADULT NON-CPAP

## 2020-11-30 PROCEDURE — 99233 SBSQ HOSP IP/OBS HIGH 50: CPT | Performed by: HOSPITALIST

## 2020-11-30 PROCEDURE — 85045 AUTOMATED RETICULOCYTE COUNT: CPT | Performed by: HOSPITALIST

## 2020-11-30 PROCEDURE — 36415 COLL VENOUS BLD VENIPUNCTURE: CPT | Performed by: HOSPITALIST

## 2020-11-30 PROCEDURE — 120N000013 HC R&B IMCU

## 2020-11-30 PROCEDURE — 83615 LACTATE (LD) (LDH) ENZYME: CPT | Performed by: HOSPITALIST

## 2020-11-30 PROCEDURE — 999N001017 HC STATISTIC GLUCOSE BY METER IP

## 2020-11-30 PROCEDURE — 80048 BASIC METABOLIC PNL TOTAL CA: CPT | Performed by: HOSPITALIST

## 2020-11-30 PROCEDURE — 85384 FIBRINOGEN ACTIVITY: CPT | Performed by: HOSPITALIST

## 2020-11-30 PROCEDURE — 97161 PT EVAL LOW COMPLEX 20 MIN: CPT | Mod: GP | Performed by: PHYSICAL THERAPIST

## 2020-11-30 PROCEDURE — 250N000013 HC RX MED GY IP 250 OP 250 PS 637: Performed by: INTERNAL MEDICINE

## 2020-11-30 PROCEDURE — 250N000011 HC RX IP 250 OP 636: Performed by: HOSPITALIST

## 2020-11-30 PROCEDURE — 85025 COMPLETE CBC W/AUTO DIFF WBC: CPT | Performed by: HOSPITALIST

## 2020-11-30 PROCEDURE — 85379 FIBRIN DEGRADATION QUANT: CPT | Performed by: HOSPITALIST

## 2020-11-30 PROCEDURE — 999N000157 HC STATISTIC RCP TIME EA 10 MIN

## 2020-11-30 PROCEDURE — 97110 THERAPEUTIC EXERCISES: CPT | Mod: GP | Performed by: PHYSICAL THERAPIST

## 2020-11-30 PROCEDURE — 85610 PROTHROMBIN TIME: CPT | Performed by: HOSPITALIST

## 2020-11-30 RX ADMIN — ENOXAPARIN SODIUM 30 MG: 30 INJECTION SUBCUTANEOUS at 08:19

## 2020-11-30 RX ADMIN — DEXAMETHASONE SODIUM PHOSPHATE 6 MG: 4 INJECTION, SOLUTION INTRAMUSCULAR; INTRAVENOUS at 08:19

## 2020-11-30 RX ADMIN — ENOXAPARIN SODIUM 30 MG: 30 INJECTION SUBCUTANEOUS at 20:54

## 2020-11-30 RX ADMIN — FAMOTIDINE 20 MG: 20 TABLET ORAL at 20:54

## 2020-11-30 RX ADMIN — ACETAMINOPHEN 650 MG: 325 TABLET, FILM COATED ORAL at 08:17

## 2020-11-30 RX ADMIN — GUAIFENESIN AND CODEINE PHOSPHATE 5 ML: 10; 100 LIQUID ORAL at 08:18

## 2020-11-30 RX ADMIN — GUAIFENESIN AND CODEINE PHOSPHATE 5 ML: 10; 100 LIQUID ORAL at 16:49

## 2020-11-30 RX ADMIN — FAMOTIDINE 20 MG: 20 TABLET ORAL at 08:15

## 2020-11-30 RX ADMIN — ALBUTEROL SULFATE 2 PUFF: 90 AEROSOL, METERED RESPIRATORY (INHALATION) at 12:08

## 2020-11-30 RX ADMIN — ALBUTEROL SULFATE 2 PUFF: 90 AEROSOL, METERED RESPIRATORY (INHALATION) at 08:13

## 2020-11-30 RX ADMIN — ALBUTEROL SULFATE 2 PUFF: 90 AEROSOL, METERED RESPIRATORY (INHALATION) at 15:40

## 2020-11-30 RX ADMIN — DOCUSATE SODIUM AND SENNOSIDES 1 TABLET: 8.6; 5 TABLET ORAL at 08:15

## 2020-11-30 RX ADMIN — ALBUTEROL SULFATE 2 PUFF: 90 AEROSOL, METERED RESPIRATORY (INHALATION) at 20:11

## 2020-11-30 RX ADMIN — DOCUSATE SODIUM AND SENNOSIDES 1 TABLET: 8.6; 5 TABLET ORAL at 20:55

## 2020-11-30 RX ADMIN — PRAVASTATIN SODIUM 40 MG: 20 TABLET ORAL at 20:54

## 2020-11-30 ASSESSMENT — MIFFLIN-ST. JEOR: SCORE: 1115.22

## 2020-11-30 ASSESSMENT — ACTIVITIES OF DAILY LIVING (ADL)
ADLS_ACUITY_SCORE: 20
ADLS_ACUITY_SCORE: 18

## 2020-11-30 NOTE — PROGRESS NOTES
11/30/20 1410   Quick Adds   Type of Visit Initial PT Evaluation       Present yes  (son able to call to assist)   Living Environment   Living Environment Comments Pt lives in house with son and daughter, son reports flight of stairs to manage at home with railing. Pt is typically ind with mobility at baseline. Son reports family will be home to assist patient with mobility.    Self-Care   Usual Activity Tolerance good   Current Activity Tolerance fair   Equipment Currently Used at Home wheelchair, manual   Disability/Function   Fall history within last six months no   General Information   Onset of Illness/Injury or Date of Surgery 11/23/20   Referring Physician Simon Mon MD   Patient/Family Therapy Goals Statement (PT) To go home   Pertinent History of Current Problem (include personal factors and/or comorbidities that impact the POC) Pt is a 60 year old female with past medical history of HTN, HLD, and GERD who was admitted on 11/23/2020 after presenting with shortness of breath and fevers for over 1 week and found to be hypoxic.   Existing Precautions/Restrictions oxygen therapy device and L/min;fall  (45L)   Weight-Bearing Status - LLE full weight-bearing   Weight-Bearing Status - RLE full weight-bearing   Cognition   Orientation Status (Cognition) oriented x 3   Affect/Mental Status (Cognition) WFL   Follows Commands (Cognition) WFL   Pain Assessment   Patient Currently in Pain No   Range of Motion (ROM)   ROM Comment B LE WFL   Strength   Strength Comments Pt functionally demonstrated at least 3/5 B LE strength; decreased functional strength and activity tolerance   Bed Mobility   Comment (Bed Mobility) SBA supine to sit   Transfers   Transfer Safety Comments CGA sit to stand with HHA   Gait/Stairs (Locomotion)   Distance in Feet (Required for LE Total Joints) 6   Comment (Gait/Stairs) CGA without AD   Balance   Balance Comments good unsupported sitting balance; fair+ standing  balance without AD   Clinical Impression   Criteria for Skilled Therapeutic Intervention yes, treatment indicated   PT Diagnosis (PT) impaired functional mobility   Influenced by the following impairments decreased functional strength and activity tolerance   Functional limitations due to impairments impaired bed mobility, transfers, ambulation, stairs   Clinical Presentation Evolving/Changing   Clinical Presentation Rationale Pt's respiratory status, high flow 02, current functional status   Clinical Decision Making (Complexity) low complexity   Therapy Frequency (PT) Daily   Predicted Duration of Therapy Intervention (days/wks) 5 days   Planned Therapy Interventions (PT) balance training;bed mobility training;gait training;home exercise program;patient/family education;strengthening;stair training;transfer training;neuromuscular re-education   Anticipated Equipment Needs at Discharge (PT) walker, rolling   Risk & Benefits of therapy have been explained evaluation/treatment results reviewed;care plan/treatment goals reviewed;risks/benefits reviewed;current/potential barriers reviewed;participants voiced agreement with care plan;participants included;patient   PT Discharge Planning    PT Discharge Recommendation (DC Rec) home with assist;home with outpatient pulmonary rehab   PT Rationale for DC Rec Patient presenting to below baseline, anticipate that with continued medical management and skilled IP PT services that patient will be safe to discharge home. Patient would benefit from OP pulmonary rehab for respiratory management and improve activity tolerance.    PT Brief overview of current status  Ax1 without AD   Total Evaluation Time   Total Evaluation Time (Minutes) 5

## 2020-11-30 NOTE — PROGRESS NOTES
Pt able to communicate needs. No c/o's. Used the bedside commode and tolerated well. SpO2 >90%.  Heaven Yepez RN

## 2020-11-30 NOTE — PLAN OF CARE
IMC, COVID+  A&Ox4, Laotian speaking   up pivot to BSC  LDA: PIV SL x2  Vitals: stable, inc HFNC 90% on 45L  Pain: denies  , regular diet  Tele: SR  GI/: incont urine d/t weakness & SOB, purewick placed  Plan: Wean O2 as able, PT consult, lovenox, decadron  Will continue to monitor

## 2020-11-30 NOTE — PLAN OF CARE
VSS. BP soft SBP,  (pt baseline per pt.) HFNC 80% FiO2, 45LPM. Tele SR. A/O X4. Pt has SOB upon exertion, and complains of CP when coughing. Pt had abd pain 4/10, but relief after tylenol was given. LS coarse/diminished. Abd soft, hypoactive. BM on shift, pt up to bedside commode with PT. Purewick in place due to weakness, good output. PIV X2, Left side. Regular diet. COVID +, precautions maintained. Continue to monitor and POC.

## 2020-11-30 NOTE — PROGRESS NOTES
Patient remains on HFNC for peep therapy. Titrated settings, current settings 40 lpm @50% for sats=92-96%. RR 18-22, HR . Patient received albuterol mdi QID with spacer.RT will continue to monitor and assess.

## 2020-11-30 NOTE — CONSULTS
"CLINICAL NUTRITION SERVICES  -  ASSESSMENT NOTE      MALNUTRITION:  % Weight Loss:  None noted  % Intake:  Decreased intake does not meet criteria for malnutrition   Subcutaneous Fat Loss: Unable to complete  Muscle Loss: Unable to complete  Fluid Retention: None documented    Malnutrition Diagnosis: Unable to determine due to inability complete physical exam per direction of department guidelines for COVID+ patients        REASON FOR ASSESSMENT  Day Russell is a 60 year old female seen by Registered Dietitian for Admission Nutrition Risk Screen for new/uncontrolled diabetes (screened 11/30/2020) and LOS    PMH of: HTN, GERD.    Admit 2/2: Respiratory needs, COVID+.    NUTRITION HISTORY  - Information obtained from chart per direction of department for COVID+ patients, limited ability to call into room with current respiratory needs.   - Allergies: NKFA.      CURRENT NUTRITION ORDERS  Diet Order:     Regular    Current Intake/Tolerance:  Mostly % intakes based on flowsheet review, some instances of 25% and 50% noted.  Consistently ordering meals though sometimes do not contain a protein source.  Remains on HFNC.      NUTRITION FOCUSED PHYSICAL ASSESSMENT FOR DIAGNOSING MALNUTRITION)  No:         Observed:    Unable to complete, COVID+    Obtained from Chart/Interdisciplinary Team:  - No documentation of PI  - Stooling patterns reviewed    ANTHROPOMETRICS  Height: 4' 9\"  Weight: 145 lbs 12.8 oz  Body mass index is 31.55 kg/m .  Weight Status:  Obesity Grade I BMI 30-34.9  Weight History:  Wt Readings from Last 10 Encounters:   11/30/20 66.1 kg (145 lb 12.8 oz)   01/28/05 65.3 kg (144 lb)   11/29/04 65.3 kg (144 lb)   09/27/04 65.3 kg (144 lb)   02/19/04 64.9 kg (143 lb)   09/10/03 64.9 kg (143 lb)     - No wt loss since admit based on available trending.  No current documentation of edema.    LABS  Labs reviewed:  Electrolytes  Potassium (mmol/L)   Date Value   11/30/2020 3.8   11/29/2020 3.6   11/28/2020 " 3.8    Blood Glucose  Glucose (mg/dL)   Date Value   11/30/2020 98   11/29/2020 100 (H)   11/28/2020 93   11/27/2020 91   11/26/2020 108 (H)     Hemoglobin A1C (%)   Date Value   09/10/2003 5.7    Inflammatory Markers  CRP Inflammation (mg/L)   Date Value   11/30/2020 51.4 (H)   11/29/2020 54.5 (H)   11/28/2020 38.4 (H)     WBC (10e9/L)   Date Value   11/30/2020 18.8 (H)   11/29/2020 19.1 (H)   11/28/2020 14.4 (H)     Albumin (g/dL)   Date Value   11/25/2020 2.4 (L)   11/24/2020 2.5 (L)   11/23/2020 2.8 (L)      Sodium (mmol/L)   Date Value   11/30/2020 138   11/29/2020 139   11/28/2020 140    Renal  Urea Nitrogen (mg/dL)   Date Value   11/30/2020 17   11/29/2020 16   11/28/2020 16     Creatinine (mg/dL)   Date Value   11/30/2020 0.52   11/29/2020 0.50 (L)   11/28/2020 0.54     Additional  Triglycerides (mg/dL)   Date Value   04/06/2005 140   01/28/2005 167 (H)   02/02/2004 118     Ketones Urine (mg/dL)   Date Value   12/26/2017 Negative        B/P: 96/55, T: 97.2, P: 92, R: 20      MEDICATIONS  Medications reviewed:    albuterol  2 puff Inhalation 4x Daily     dexamethasone  6 mg Intravenous Q24H     enoxaparin ANTICOAGULANT  30 mg Subcutaneous BID     famotidine  20 mg Oral BID     insulin aspart  1-7 Units Subcutaneous TID AC     insulin aspart  1-5 Units Subcutaneous At Bedtime     polyethylene glycol  17 g Oral Daily     pravastatin  40 mg Oral At Bedtime     senna-docusate  1 tablet Oral BID    Or     senna-docusate  2 tablet Oral BID     sodium chloride (PF)  3 mL Intracatheter Q8H        - MEDICATION INSTRUCTIONS -       - MEDICATION INSTRUCTIONS -       - MEDICATION INSTRUCTIONS -       - MEDICATION INSTRUCTIONS -            ASSESSED NUTRITION NEEDS PER APPROVED PRACTICE GUIDELINES:    Dosing Weight 66 kg  Estimated Energy Needs: >/=1650 kcals (>/=25 Kcal/Kg)  Justification: minimum maintenance with active COVID  Estimated Protein Needs: >/=79 grams protein (1.2 g pro/Kg)  Justification: preservation of  lean body mass with active COVID  Estimated Fluid Needs: per MD      NUTRITION DIAGNOSIS:  Predicted inadequate nutrient intake (protein) related to potential for decline in PO trends, specifically protein during admit.     NUTRITION INTERVENTIONS  Recommendations / Nutrition Prescription  Diet per MD.    Add supplement offerings at meals for protein push.      Implementation  Nutrition education: Deferred, limited ability at this time.    Medical Food Supplement: As above.     Collaboration and Referral of Nutrition care: Discussed POC with team during rounds.      Nutrition Goals  Patient to consume 75% of meals TID.       MONITORING AND EVALUATION:  Progress towards goals will be monitored and evaluated per protocol and Practice Guidelines          Deidre Langley RDN, LD  Clinical Dietitian  3rd floor/ICU: [phone]  All other floors: [phone]  Weekend/holiday: [phone]

## 2020-11-30 NOTE — PROGRESS NOTES
"Patient remains on HFNC @ 45LPM and 80% for PEEP therapy. Pt is tolerating it well. Will continue to monitor and assess the pt's current respiratory status and needs.    /65 (BP Location: Right arm)   Pulse 87   Temp 97.8  F (36.6  C) (Oral)   Resp 20   Ht 1.448 m (4' 9\")   Wt 66.1 kg (145 lb 11.6 oz)   SpO2 95%   BMI 31.53 kg/m          Diego Nicolas, RT    "

## 2020-11-30 NOTE — PROGRESS NOTES
[facility]    Hospitalist Progress Note      Assessment & Plan   Day Russell is a 60 year old female with past medical history of HTN, HLD, and GERD who was admitted on 11/23/2020 after presenting with shortness of breath and fevers for over 1 week and found to be hypoxic.  CTPA ruled out PE, but did show significant diffuse patchy groundglass opacities consistent with COVID-19 infection.  Reportedly she was diagnosed with COVID-19 on 11/19.       #Acute hypoxemic respiratory failure secondary to COVID-19 pneumonia: Presenting with 1 week of progressive shortness of breath and fevers.  Found to be hypoxic.  CTPA shows no PE, but shows significant diffuse groundglass opacities consistent with COVID-19.  Reportedly tested positive for COVID-19 on 11/19, results not available to me.  Low-grade temperatures here.  Elevated CRP.  -Patient with slow clinical improvement.  Still requiring high flow nasal cannula.  Weaning as able.  -Procalcitonin is negative.    -Completed course of IV Remdesevir on 11/27  -Continue IV Decadron 6 mg daily day 8/10  -Robitussin with codeine as needed  -Lovenox 30 mg twice daily for DVT prophylaxis  -Albuterol HFA if wheezing     #Chest pain: Suspect secondary to COVID-19 pneumonia.  No PE on CT.  Troponin negative x2.     #Hyperglycemia: No history of diabetes.  Secondary to steroids.  Sliding scale insulin as needed.     #HLD: Continue PTA pravastatin 40 mg at bedtime.     DVT Prophylaxis: Enoxaparin (Lovenox) SQ 40 mg twice daily  Code Status: Full Code  Dispo: Continue inpatient cares.    Simon Mon MD  Text Page    Interval History   No acute events overnight.  Patient still feels a bit better each day.  Still having cough.  No subjective fevers.  No nausea or vomiting.  Today is voicing that she is hopeful for discharge home for the first time.    -Data reviewed today: I reviewed all new labs and imaging results over the last 24 hours.    Physical Exam    Temp: 97.2  F (36.2  C) Temp src: Oral BP: 107/68 Pulse: 79   Resp: 20 SpO2: 97 % O2 Device: High Flow Nasal Cannula (HFNC) Oxygen Delivery: 45 LPM  Vitals:    11/28/20 0515 11/29/20 0500 11/30/20 0700   Weight: 66.8 kg (147 lb 4.3 oz) 66.1 kg (145 lb 11.6 oz) 66.1 kg (145 lb 12.8 oz)     Vital Signs with Ranges  Temp:  [97.2  F (36.2  C)-98.3  F (36.8  C)] 97.2  F (36.2  C)  Pulse:  [79-97] 79  Resp:  [18-36] 20  BP: (100-116)/(63-75) 107/68  FiO2 (%):  [80 %-90 %] 90 %  SpO2:  [87 %-97 %] 97 %  I/O last 3 completed shifts:  In: 160 [P.O.:150; I.V.:10]  Out: 700 [Urine:700]    Constitutional: High flow in place. Improved tachypnea.   HEENT: Normocephalic. MMM, No elevation of JVD noted.   Respiratory: +improved Tachypnea. Coarse bilaterally. No wheezes..   Cardiovascular: Regular, no murmur  GI: BS+, NT, ND  Skin/Integumen: WWP, no rash. No edema  Neuro: CNII-XII intact. Moves all extremities. No tremor. A&Ox3.    Medications     - MEDICATION INSTRUCTIONS -       - MEDICATION INSTRUCTIONS -       - MEDICATION INSTRUCTIONS -       - MEDICATION INSTRUCTIONS -         albuterol  2 puff Inhalation 4x Daily     dexamethasone  6 mg Intravenous Q24H     enoxaparin ANTICOAGULANT  30 mg Subcutaneous BID     famotidine  20 mg Oral BID     insulin aspart  1-7 Units Subcutaneous TID AC     insulin aspart  1-5 Units Subcutaneous At Bedtime     polyethylene glycol  17 g Oral Daily     pravastatin  40 mg Oral At Bedtime     senna-docusate  1 tablet Oral BID    Or     senna-docusate  2 tablet Oral BID     sodium chloride (PF)  3 mL Intracatheter Q8H       Data   Recent Labs   Lab 11/30/20  0743 11/29/20  0700 11/28/20  0657 11/25/20  0605 11/25/20  0605 11/24/20  0508 11/23/20  1331   WBC 18.8* 19.1* 14.4*   < > 8.5 4.1 5.6   HGB 14.4 14.5 14.4   < > 13.3 13.5 14.5   MCV 97 97 98   < > 98 99 98    393 355   < > 311 215 212   INR 1.08 1.10 1.12   < >  --  0.95 0.82*    139 140   < > 144 143 139   POTASSIUM 3.8 3.6  3.8   < > 3.7 4.2 3.4   CHLORIDE 108 109 111*   < > 115* 113* 110*   CO2 27 26 26   < > 23 24 23   BUN 17 16 16   < > 21 9 9   CR 0.52 0.50* 0.54   < > 0.60 0.44* 0.65   ANIONGAP 3 4 3   < > 6 6 6   LEELA 8.4* 8.5 8.4*   < > 8.6 7.9* 8.2*   GLC 98 100* 93   < > 110* 132* 94   ALBUMIN  --   --   --   --  2.4* 2.5* 2.8*   PROTTOTAL  --   --   --   --  7.1 7.2 7.7   BILITOTAL  --   --   --   --  0.3 0.4 0.2   ALKPHOS  --   --   --   --  72 73 73   ALT  --   --   --   --  46 51* 50   AST  --   --   --   --  49* 70* 75*   TROPI  --   --   --   --   --  <0.015 <0.015    < > = values in this interval not displayed.       No results found for this or any previous visit (from the past 24 hour(s)).

## 2020-12-01 ENCOUNTER — APPOINTMENT (OUTPATIENT)
Dept: PHYSICAL THERAPY | Facility: CLINIC | Age: 60
End: 2020-12-01
Payer: COMMERCIAL

## 2020-12-01 LAB
ANION GAP SERPL CALCULATED.3IONS-SCNC: 7 MMOL/L (ref 3–14)
BUN SERPL-MCNC: 15 MG/DL (ref 7–30)
CALCIUM SERPL-MCNC: 8.9 MG/DL (ref 8.5–10.1)
CHLORIDE SERPL-SCNC: 108 MMOL/L (ref 94–109)
CO2 SERPL-SCNC: 25 MMOL/L (ref 20–32)
CREAT SERPL-MCNC: 0.55 MG/DL (ref 0.52–1.04)
ERYTHROCYTE [DISTWIDTH] IN BLOOD BY AUTOMATED COUNT: 12.3 % (ref 10–15)
GFR SERPL CREATININE-BSD FRML MDRD: >90 ML/MIN/{1.73_M2}
GLUCOSE BLDC GLUCOMTR-MCNC: 111 MG/DL (ref 70–99)
GLUCOSE BLDC GLUCOMTR-MCNC: 133 MG/DL (ref 70–99)
GLUCOSE BLDC GLUCOMTR-MCNC: 184 MG/DL (ref 70–99)
GLUCOSE BLDC GLUCOMTR-MCNC: 259 MG/DL (ref 70–99)
GLUCOSE BLDC GLUCOMTR-MCNC: 76 MG/DL (ref 70–99)
GLUCOSE BLDC GLUCOMTR-MCNC: 89 MG/DL (ref 70–99)
GLUCOSE SERPL-MCNC: 83 MG/DL (ref 70–99)
HCT VFR BLD AUTO: 48.1 % (ref 35–47)
HGB BLD-MCNC: 16.2 G/DL (ref 11.7–15.7)
LACTATE BLD-SCNC: 2.6 MMOL/L (ref 0.7–2)
MAGNESIUM SERPL-MCNC: 2.4 MG/DL (ref 1.6–2.3)
MCH RBC QN AUTO: 32.2 PG (ref 26.5–33)
MCHC RBC AUTO-ENTMCNC: 33.7 G/DL (ref 31.5–36.5)
MCV RBC AUTO: 96 FL (ref 78–100)
PLATELET # BLD AUTO: 401 10E9/L (ref 150–450)
POTASSIUM SERPL-SCNC: 4 MMOL/L (ref 3.4–5.3)
RBC # BLD AUTO: 5.03 10E12/L (ref 3.8–5.2)
SODIUM SERPL-SCNC: 140 MMOL/L (ref 133–144)
WBC # BLD AUTO: 19.7 10E9/L (ref 4–11)

## 2020-12-01 PROCEDURE — 85027 COMPLETE CBC AUTOMATED: CPT | Performed by: HOSPITALIST

## 2020-12-01 PROCEDURE — 99207 PR CDG-MDM COMPONENT: MEETS MODERATE - UP CODED: CPT | Performed by: INTERNAL MEDICINE

## 2020-12-01 PROCEDURE — 36415 COLL VENOUS BLD VENIPUNCTURE: CPT | Performed by: INTERNAL MEDICINE

## 2020-12-01 PROCEDURE — 83735 ASSAY OF MAGNESIUM: CPT | Performed by: HOSPITALIST

## 2020-12-01 PROCEDURE — 250N000013 HC RX MED GY IP 250 OP 250 PS 637: Performed by: INTERNAL MEDICINE

## 2020-12-01 PROCEDURE — 36415 COLL VENOUS BLD VENIPUNCTURE: CPT | Performed by: HOSPITALIST

## 2020-12-01 PROCEDURE — 120N000013 HC R&B IMCU

## 2020-12-01 PROCEDURE — 999N000157 HC STATISTIC RCP TIME EA 10 MIN

## 2020-12-01 PROCEDURE — 83605 ASSAY OF LACTIC ACID: CPT | Performed by: INTERNAL MEDICINE

## 2020-12-01 PROCEDURE — 250N000011 HC RX IP 250 OP 636: Performed by: HOSPITALIST

## 2020-12-01 PROCEDURE — 250N000013 HC RX MED GY IP 250 OP 250 PS 637: Performed by: HOSPITALIST

## 2020-12-01 PROCEDURE — 97530 THERAPEUTIC ACTIVITIES: CPT | Mod: GP | Performed by: PHYSICAL THERAPIST

## 2020-12-01 PROCEDURE — 999N001017 HC STATISTIC GLUCOSE BY METER IP

## 2020-12-01 PROCEDURE — 99233 SBSQ HOSP IP/OBS HIGH 50: CPT | Performed by: INTERNAL MEDICINE

## 2020-12-01 PROCEDURE — 97116 GAIT TRAINING THERAPY: CPT | Mod: GP | Performed by: PHYSICAL THERAPIST

## 2020-12-01 PROCEDURE — 80048 BASIC METABOLIC PNL TOTAL CA: CPT | Performed by: HOSPITALIST

## 2020-12-01 RX ORDER — MAGNESIUM HYDROXIDE/ALUMINUM HYDROXICE/SIMETHICONE 120; 1200; 1200 MG/30ML; MG/30ML; MG/30ML
30 SUSPENSION ORAL EVERY 4 HOURS PRN
Status: DISCONTINUED | OUTPATIENT
Start: 2020-12-01 | End: 2020-12-05 | Stop reason: HOSPADM

## 2020-12-01 RX ORDER — ALBUTEROL SULFATE 90 UG/1
2 AEROSOL, METERED RESPIRATORY (INHALATION) EVERY 6 HOURS PRN
Status: DISCONTINUED | OUTPATIENT
Start: 2020-12-01 | End: 2020-12-05 | Stop reason: HOSPADM

## 2020-12-01 RX ADMIN — DEXAMETHASONE SODIUM PHOSPHATE 6 MG: 4 INJECTION, SOLUTION INTRAMUSCULAR; INTRAVENOUS at 09:01

## 2020-12-01 RX ADMIN — OXYCODONE HYDROCHLORIDE 5 MG: 5 TABLET ORAL at 10:48

## 2020-12-01 RX ADMIN — ENOXAPARIN SODIUM 30 MG: 30 INJECTION SUBCUTANEOUS at 21:22

## 2020-12-01 RX ADMIN — FAMOTIDINE 20 MG: 20 TABLET ORAL at 09:01

## 2020-12-01 RX ADMIN — FAMOTIDINE 20 MG: 20 TABLET ORAL at 21:22

## 2020-12-01 RX ADMIN — ENOXAPARIN SODIUM 30 MG: 30 INJECTION SUBCUTANEOUS at 09:01

## 2020-12-01 RX ADMIN — INSULIN ASPART 1 UNITS: 100 INJECTION, SOLUTION INTRAVENOUS; SUBCUTANEOUS at 12:21

## 2020-12-01 RX ADMIN — ALBUTEROL SULFATE 2 PUFF: 90 AEROSOL, METERED RESPIRATORY (INHALATION) at 12:19

## 2020-12-01 RX ADMIN — PRAVASTATIN SODIUM 40 MG: 20 TABLET ORAL at 21:21

## 2020-12-01 RX ADMIN — OXYCODONE HYDROCHLORIDE 10 MG: 5 TABLET ORAL at 14:24

## 2020-12-01 RX ADMIN — GUAIFENESIN AND CODEINE PHOSPHATE 5 ML: 10; 100 LIQUID ORAL at 10:48

## 2020-12-01 RX ADMIN — ALBUTEROL SULFATE 2 PUFF: 90 AEROSOL, METERED RESPIRATORY (INHALATION) at 09:02

## 2020-12-01 ASSESSMENT — ACTIVITIES OF DAILY LIVING (ADL)
ADLS_ACUITY_SCORE: 18

## 2020-12-01 ASSESSMENT — MIFFLIN-ST. JEOR: SCORE: 956.92

## 2020-12-01 NOTE — PLAN OF CARE
VS stable. 91-97% on hi flow oxygen. Diminished LS. Tele is sinus rhythm. No complaints of pain. Blood sugar level of 111. Slept well throughout the night.

## 2020-12-01 NOTE — PLAN OF CARE
VSS, afeb., LS are dim, 94% on  High Flow. Pt has good appetite, ate 100% for dinner, sat up on side of bed. Pt had a lg bm this shift on bsc. Pt is alert and oriented, denies pain. Infrequent dry cough, Robitussin given. BG was 137.Tele is SR. Pt is Full Code.

## 2020-12-01 NOTE — PLAN OF CARE
Pt A&O x4, up with A1 and gait belt. 95% on HFNC 40L and 60% FIO2, all other VSS. PO oxycodone given x1 for abdominal pain, and PRN robitussin given for cough. Loation speaking, understands some english without use of interperter. BG slightly low, see flowsheets. OJ given for BG of 79 with notable increase. LS dim with fine crackles. Tele SR. PT following. Discharge home once breathing back to baseline. Will continue POC.

## 2020-12-01 NOTE — PLAN OF CARE
4:05 PM  Text paged MD re: Flagged sepsis protocol. Lactic came back at 2.6. Thank you       Admission: Pt admitted on 11/23 for evaluation of shortness of breath. Tested positive for COVID four days prior to admission   History: GERD, Hyperlipidemia   Labs/Protocols: Na-140, K+-4.0, Lactic-2.6, Mag 2.4  Vitals: Temp: 95.3  F (35.2  C) Temp src: Axillary BP: 103/67 Pulse: 94   Resp: 20 SpO2: 94 % O2 Device: High Flow Nasal Cannula (HFNC) Oxygen Delivery: 40 LPM  Pain: Denies pain at this time i  Tele: SR  Cardiac: Denies chest pain or tightness-Regular rate and rhythm   Respiratory: Lungs are diminished-Remains on HFNC at 40 liters-TALBOT with moving to bedside commode-Encouraged IS-Continues on decadron  Neuro: A&Ox4-Speaks some english- phone in room speaks Dean  GI/: Purewick in place with good output-Continent of bowel   Skin: Intact   LDA: 2 PIV saline locked   Diet: Regular-Fair appetite   Activity: A-1 walker and gait belt-PT consulted   Pt educated on current POC: Monitor respiratory status, Monitor labs, Continue with decadron, PT, Encourage IS   Discharge Plan: Home in 2-3 days

## 2020-12-01 NOTE — PROGRESS NOTES
[facility]    Hospitalist Progress Note      Assessment & Plan   Day Russell is a 60 year old female who was admitted on 11/23/2020.    Summary of Stay:   Day Russell is a 60 year old female with past medical history of HTN, HLD, and GERD who was admitted on 11/23/2020 after presenting with shortness of breath and fevers for over 1 week and found to be hypoxic.  CTPA ruled out PE, but did show significant diffuse patchy groundglass opacities consistent with COVID-19 infection.  Reportedly she was diagnosed with COVID-19 on 11/19.      Plan:    Acute hypoxic respiratory failure.  COVID-19 pneumonia.  -Completed remdesivir on 11/27/2020.  -On day 9 of IV dexamethasone.  -Continues to be on high flow nasal cannula but slowly improving.  -Overall comfortable.  Not in any distress.  No use of accessory muscles.  -On Lovenox 30 mg twice daily for DVT prophylaxis.  -No wheezing on exam.  Change albuterol to as needed.    Dyslipidemia  -Continue pravastatin.    Heartburn  -On Pepcid.  Add Maalox.    Hyperglycemia  -In the setting of acute issues plus dexamethasone use.  -Overall blood glucose numbers are fairly stable.  No history of diabetes.  -Discontinue fingerstick glucose monitoring.      DVT Prophylaxis: Enoxaparin (Lovenox) SQ  Code Status: Full Code  Expected discharge: 2-3 days    Bret Kim MD  Text Page (7am - 6pm, M-F)    Interval History   Patient was evaluated with nursing staff. Overnight issues discussed.    Review of systems:  No nausea or vomiting.  No abdominal pain.  No diarrhea.  No chest pain/palpitations.  No headache/visual disturbance/new weakness.    -Data reviewed today: Labs and medications.    Physical Exam   Temp: 96.6  F (35.9  C) Temp src: Oral BP: 113/64 Pulse: 98   Resp: 22 SpO2: 98 % O2 Device: High Flow Nasal Cannula (HFNC) Oxygen Delivery: 40 LPM  Vitals:    11/29/20 0500 11/30/20 0700 12/01/20 0544   Weight: 66.1 kg (145 lb 11.6 oz) 66.1 kg (145 lb 12.8  oz) 50.3 kg (110 lb 14.4 oz)     Vital Signs with Ranges  Temp:  [96  F (35.6  C)-97.8  F (36.6  C)] 96.6  F (35.9  C)  Pulse:  [61-98] 98  Resp:  [16-22] 22  BP: ()/(64-81) 113/64  FiO2 (%):  [50 %-60 %] 60 %  SpO2:  [87 %-98 %] 98 %  I/O last 3 completed shifts:  In: 960 [P.O.:960]  Out: 1650 [Urine:1650]    Constitutional: Awake, alert, cooperative, no apparent distress  HEENT: Trachea midline, sclera is clear   Respiratory: mild crackles. No wheezing. Equal breath sounds bilaterally.  Cardiovascular: Regular rate and rhythm, normal S1 and S2, and no murmur noted  GI: Normal bowel sounds, soft, non-distended, non-tender      Medications     - MEDICATION INSTRUCTIONS -       - MEDICATION INSTRUCTIONS -       - MEDICATION INSTRUCTIONS -       - MEDICATION INSTRUCTIONS -         albuterol  2 puff Inhalation 4x Daily     dexamethasone  6 mg Intravenous Q24H     enoxaparin ANTICOAGULANT  30 mg Subcutaneous BID     famotidine  20 mg Oral BID     insulin aspart  1-7 Units Subcutaneous TID AC     insulin aspart  1-5 Units Subcutaneous At Bedtime     polyethylene glycol  17 g Oral Daily     pravastatin  40 mg Oral At Bedtime     senna-docusate  1 tablet Oral BID    Or     senna-docusate  2 tablet Oral BID     sodium chloride (PF)  3 mL Intracatheter Q8H       Data   Recent Labs   Lab 12/01/20  0819 11/30/20  0743 11/29/20  0700 11/28/20  0657 11/25/20  0605 11/25/20  0605   WBC 19.7* 18.8* 19.1* 14.4*   < > 8.5   HGB 16.2* 14.4 14.5 14.4   < > 13.3   MCV 96 97 97 98   < > 98    424 393 355   < > 311   INR  --  1.08 1.10 1.12   < >  --     138 139 140   < > 144   POTASSIUM 4.0 3.8 3.6 3.8   < > 3.7   CHLORIDE 108 108 109 111*   < > 115*   CO2 25 27 26 26   < > 23   BUN 15 17 16 16   < > 21   CR 0.55 0.52 0.50* 0.54   < > 0.60   ANIONGAP 7 3 4 3   < > 6   LEELA 8.9 8.4* 8.5 8.4*   < > 8.6   GLC 83 98 100* 93   < > 110*   ALBUMIN  --   --   --   --   --  2.4*   PROTTOTAL  --   --   --   --   --  7.1    BILITOTAL  --   --   --   --   --  0.3   ALKPHOS  --   --   --   --   --  72   ALT  --   --   --   --   --  46   AST  --   --   --   --   --  49*    < > = values in this interval not displayed.       No results found for this or any previous visit (from the past 24 hour(s)).

## 2020-12-02 ENCOUNTER — APPOINTMENT (OUTPATIENT)
Dept: PHYSICAL THERAPY | Facility: CLINIC | Age: 60
End: 2020-12-02
Payer: COMMERCIAL

## 2020-12-02 LAB
ALBUMIN SERPL-MCNC: 2.4 G/DL (ref 3.4–5)
ALP SERPL-CCNC: 77 U/L (ref 40–150)
ALT SERPL W P-5'-P-CCNC: 27 U/L (ref 0–50)
ANION GAP SERPL CALCULATED.3IONS-SCNC: 7 MMOL/L (ref 3–14)
AST SERPL W P-5'-P-CCNC: 16 U/L (ref 0–45)
BILIRUB SERPL-MCNC: 0.3 MG/DL (ref 0.2–1.3)
BUN SERPL-MCNC: 28 MG/DL (ref 7–30)
CALCIUM SERPL-MCNC: 8.9 MG/DL (ref 8.5–10.1)
CHLORIDE SERPL-SCNC: 105 MMOL/L (ref 94–109)
CO2 SERPL-SCNC: 26 MMOL/L (ref 20–32)
CREAT SERPL-MCNC: 0.57 MG/DL (ref 0.52–1.04)
ERYTHROCYTE [DISTWIDTH] IN BLOOD BY AUTOMATED COUNT: 12.3 % (ref 10–15)
GFR SERPL CREATININE-BSD FRML MDRD: >90 ML/MIN/{1.73_M2}
GLUCOSE SERPL-MCNC: 167 MG/DL (ref 70–99)
HCT VFR BLD AUTO: 43.6 % (ref 35–47)
HGB BLD-MCNC: 14.3 G/DL (ref 11.7–15.7)
LIPASE SERPL-CCNC: 104 U/L (ref 73–393)
MCH RBC QN AUTO: 31.8 PG (ref 26.5–33)
MCHC RBC AUTO-ENTMCNC: 32.8 G/DL (ref 31.5–36.5)
MCV RBC AUTO: 97 FL (ref 78–100)
PLATELET # BLD AUTO: 409 10E9/L (ref 150–450)
POTASSIUM SERPL-SCNC: 4 MMOL/L (ref 3.4–5.3)
PROT SERPL-MCNC: 6.7 G/DL (ref 6.8–8.8)
RBC # BLD AUTO: 4.5 10E12/L (ref 3.8–5.2)
SODIUM SERPL-SCNC: 138 MMOL/L (ref 133–144)
TROPONIN I SERPL-MCNC: <0.015 UG/L (ref 0–0.04)
TROPONIN I SERPL-MCNC: <0.015 UG/L (ref 0–0.04)
WBC # BLD AUTO: 20.5 10E9/L (ref 4–11)

## 2020-12-02 PROCEDURE — 83690 ASSAY OF LIPASE: CPT | Performed by: INTERNAL MEDICINE

## 2020-12-02 PROCEDURE — 250N000011 HC RX IP 250 OP 636: Performed by: INTERNAL MEDICINE

## 2020-12-02 PROCEDURE — 80053 COMPREHEN METABOLIC PANEL: CPT | Performed by: INTERNAL MEDICINE

## 2020-12-02 PROCEDURE — C9113 INJ PANTOPRAZOLE SODIUM, VIA: HCPCS | Performed by: INTERNAL MEDICINE

## 2020-12-02 PROCEDURE — 97116 GAIT TRAINING THERAPY: CPT | Mod: GP | Performed by: PHYSICAL THERAPIST

## 2020-12-02 PROCEDURE — 250N000011 HC RX IP 250 OP 636: Performed by: HOSPITALIST

## 2020-12-02 PROCEDURE — 85027 COMPLETE CBC AUTOMATED: CPT | Performed by: INTERNAL MEDICINE

## 2020-12-02 PROCEDURE — 120N000013 HC R&B IMCU

## 2020-12-02 PROCEDURE — 250N000013 HC RX MED GY IP 250 OP 250 PS 637: Performed by: INTERNAL MEDICINE

## 2020-12-02 PROCEDURE — 250N000013 HC RX MED GY IP 250 OP 250 PS 637: Performed by: HOSPITALIST

## 2020-12-02 PROCEDURE — 999N000157 HC STATISTIC RCP TIME EA 10 MIN

## 2020-12-02 PROCEDURE — 36415 COLL VENOUS BLD VENIPUNCTURE: CPT | Performed by: INTERNAL MEDICINE

## 2020-12-02 PROCEDURE — 84484 ASSAY OF TROPONIN QUANT: CPT | Performed by: INTERNAL MEDICINE

## 2020-12-02 PROCEDURE — 99233 SBSQ HOSP IP/OBS HIGH 50: CPT | Performed by: INTERNAL MEDICINE

## 2020-12-02 PROCEDURE — 93010 ELECTROCARDIOGRAM REPORT: CPT | Performed by: INTERNAL MEDICINE

## 2020-12-02 PROCEDURE — 93005 ELECTROCARDIOGRAM TRACING: CPT

## 2020-12-02 PROCEDURE — 999N000215 HC STATISTIC HFNC ADULT NON-CPAP

## 2020-12-02 PROCEDURE — 97530 THERAPEUTIC ACTIVITIES: CPT | Mod: GP | Performed by: PHYSICAL THERAPIST

## 2020-12-02 RX ADMIN — OXYCODONE HYDROCHLORIDE 10 MG: 5 TABLET ORAL at 18:01

## 2020-12-02 RX ADMIN — ACETAMINOPHEN 650 MG: 325 TABLET, FILM COATED ORAL at 08:39

## 2020-12-02 RX ADMIN — PRAVASTATIN SODIUM 40 MG: 20 TABLET ORAL at 21:09

## 2020-12-02 RX ADMIN — ENOXAPARIN SODIUM 30 MG: 30 INJECTION SUBCUTANEOUS at 21:09

## 2020-12-02 RX ADMIN — OXYCODONE HYDROCHLORIDE 10 MG: 5 TABLET ORAL at 08:39

## 2020-12-02 RX ADMIN — ALUMINUM HYDROXIDE, MAGNESIUM HYDROXIDE, AND SIMETHICONE 30 ML: 200; 200; 20 SUSPENSION ORAL at 09:10

## 2020-12-02 RX ADMIN — PANTOPRAZOLE SODIUM 40 MG: 40 INJECTION, POWDER, FOR SOLUTION INTRAVENOUS at 11:12

## 2020-12-02 RX ADMIN — ENOXAPARIN SODIUM 30 MG: 30 INJECTION SUBCUTANEOUS at 08:39

## 2020-12-02 RX ADMIN — FAMOTIDINE 20 MG: 20 TABLET ORAL at 08:39

## 2020-12-02 RX ADMIN — DEXAMETHASONE SODIUM PHOSPHATE 6 MG: 4 INJECTION, SOLUTION INTRAMUSCULAR; INTRAVENOUS at 08:39

## 2020-12-02 ASSESSMENT — ACTIVITIES OF DAILY LIVING (ADL)
ADLS_ACUITY_SCORE: 18

## 2020-12-02 ASSESSMENT — MIFFLIN-ST. JEOR: SCORE: 1075.31

## 2020-12-02 NOTE — PROGRESS NOTES
[facility]    Hospitalist Progress Note      Assessment & Plan   Day Russell is a 60 year old female who was admitted on 11/23/2020.    Summary of Stay:   Day Russell is a 60 year old female with past medical history of HTN, HLD, and GERD who was admitted on 11/23/2020 after presenting with shortness of breath and fevers for over 1 week and found to be hypoxic.  CTPA ruled out PE, but did show significant diffuse patchy groundglass opacities consistent with COVID-19 infection.  Reportedly she was diagnosed with COVID-19 on 11/19.      She continues to be on high flow nasal cannula.  But slowly the requirement is coming down. Overall appears stable.    Patient has been complaining of epigastric abdominal discomfort since yesterday.  Feels it is worse with coughing. No correlation with eating or activity.    On 12/2/20, patient had an episode of diaphoresis when she was getting up and moving with physical therapy.  It is not clear if she became hypoxic or not.  But she did have drop in the blood pressure with symptoms.    Plan:    Acute hypoxic respiratory failure.  COVID-19 pneumonia.  -Completed remdesivir on 11/27/2020.  -On day 9 of IV dexamethasone. Stop tomorrow  -Continues to be on high flow nasal cannula but slowly improving.  -Overall comfortable.  Not in any distress.  No use of accessory muscles.  -On Lovenox 30 mg twice daily for DVT prophylaxis.  -No wheezing on exam.  Change albuterol to as needed.    Epigastric discomfort.  -Possibly dyspepsia/GERD.  -EKG done which was unremarkable.  Negative troponin.  -No obvious tenderness on exam in the epigastric or right upper quadrant area.  LFTs are normal.  Unlikely to be cholecystitis.  -Change Pepcid to Protonix.  Try Maalox.  -No EGD available in the past.    Leukocytosis  -Likely steroid effect.  No fever.  -Monitor.     Dyslipidemia  -Continue pravastatin.     Hyperglycemia  -In the setting of acute issues plus dexamethasone  use.  -Overall blood glucose numbers are fairly stable.  No history  of diabetes.      DVT Prophylaxis: Enoxaparin (Lovenox) SQ  Code Status: Full Code  Expected discharge: 3-4 days    Bret Kim MD  Text Page (7am - 6pm, M-F)    Interval History   Patient was evaluated with nursing staff. Overnight issues discussed.    Review of systems:  No nausea or vomiting. No diarrhea.  No chest pain/palpitations.  No new cough/shortness of breath.  No headache/visual disturbance/new weakness.    -Data reviewed today: Labs and medications.    Physical Exam   Temp: 96.5  F (35.8  C) Temp src: Oral BP: 117/84 Pulse: 67   Resp: 20 SpO2: 97 % O2 Device: High Flow Nasal Cannula (HFNC) Oxygen Delivery: 30 LPM  Vitals:    11/30/20 0700 12/01/20 0544 12/02/20 1028   Weight: 66.1 kg (145 lb 12.8 oz) 50.3 kg (110 lb 14.4 oz) 62.1 kg (137 lb)     Vital Signs with Ranges  Temp:  [95.3  F (35.2  C)-98.1  F (36.7  C)] 96.5  F (35.8  C)  Pulse:  [] 67  Resp:  [16-22] 20  BP: ()/(60-86) 117/84  FiO2 (%):  [50 %-60 %] 50 %  SpO2:  [92 %-100 %] 97 %  I/O last 3 completed shifts:  In: 580 [P.O.:580]  Out: 1250 [Urine:1250]    Constitutional: Awake, alert, cooperative, no apparent distress  HEENT: Trachea midline, sclera is clear   Respiratory: No crackles. No wheezing. Equal breath sounds bilaterally.  Cardiovascular: Regular rate and rhythm, normal S1 and S2, and no murmur noted  GI: Normal bowel sounds, soft, non-distended, non-tender  Extremities: No pitting edema     Medications     - MEDICATION INSTRUCTIONS -       - MEDICATION INSTRUCTIONS -       - MEDICATION INSTRUCTIONS -       - MEDICATION INSTRUCTIONS -         dexamethasone  6 mg Intravenous Q24H     enoxaparin ANTICOAGULANT  30 mg Subcutaneous BID     pantoprazole (PROTONIX) IV  40 mg Intravenous Daily with breakfast     pravastatin  40 mg Oral At Bedtime     sodium chloride (PF)  3 mL Intracatheter Q8H       Data   Recent Labs   Lab 12/02/20  1122 12/01/20  0819  11/30/20  0743 11/29/20  0700 11/28/20  0657   WBC 20.5* 19.7* 18.8* 19.1* 14.4*   HGB 14.3 16.2* 14.4 14.5 14.4   MCV 97 96 97 97 98    401 424 393 355   INR  --   --  1.08 1.10 1.12    140 138 139 140   POTASSIUM 4.0 4.0 3.8 3.6 3.8   CHLORIDE 105 108 108 109 111*   CO2 26 25 27 26 26   BUN 28 15 17 16 16   CR 0.57 0.55 0.52 0.50* 0.54   ANIONGAP 7 7 3 4 3   LEELA 8.9 8.9 8.4* 8.5 8.4*   * 83 98 100* 93   ALBUMIN 2.4*  --   --   --   --    PROTTOTAL 6.7*  --   --   --   --    BILITOTAL 0.3  --   --   --   --    ALKPHOS 77  --   --   --   --    ALT 27  --   --   --   --    AST 16  --   --   --   --    LIPASE 104  --   --   --   --    TROPI <0.015  --   --   --   --        No results found for this or any previous visit (from the past 24 hour(s)).

## 2020-12-02 NOTE — PROVIDER NOTIFICATION
Dr. Kim paged: pt became diaphoretic with physical therapy walking room. b/p 85/60. pain a little better. b/p now 92/66 after back in bed. MD called back. Troponin and ekg ordered. Pt states her epigastrum pain is improving . Appears comfortable in bed. B/p continues to improve. Vss.

## 2020-12-02 NOTE — PLAN OF CARE
B/p soft. Low after ambulation with PT. Improved with rest. Sats stable on High flow.. up with A1 and walker. Pt calls for assist.  at bedside. Still difficult to understand pt with  at times. Continues to c/o epigastric pain. States pain is better with deep breath.

## 2020-12-02 NOTE — PLAN OF CARE
VS stable. 94-95% on hi flow oxygen. Diminished LS. TALBOT. No complaints of pain. Slept well throughout the night.

## 2020-12-03 LAB
ERYTHROCYTE [DISTWIDTH] IN BLOOD BY AUTOMATED COUNT: 12.3 % (ref 10–15)
HCT VFR BLD AUTO: 48.2 % (ref 35–47)
HGB BLD-MCNC: 16.1 G/DL (ref 11.7–15.7)
INTERPRETATION ECG - MUSE: NORMAL
MCH RBC QN AUTO: 32.4 PG (ref 26.5–33)
MCHC RBC AUTO-ENTMCNC: 33.4 G/DL (ref 31.5–36.5)
MCV RBC AUTO: 97 FL (ref 78–100)
PLATELET # BLD AUTO: 460 10E9/L (ref 150–450)
RBC # BLD AUTO: 4.97 10E12/L (ref 3.8–5.2)
WBC # BLD AUTO: 21.2 10E9/L (ref 4–11)

## 2020-12-03 PROCEDURE — C9113 INJ PANTOPRAZOLE SODIUM, VIA: HCPCS | Performed by: INTERNAL MEDICINE

## 2020-12-03 PROCEDURE — 250N000011 HC RX IP 250 OP 636: Performed by: INTERNAL MEDICINE

## 2020-12-03 PROCEDURE — 99232 SBSQ HOSP IP/OBS MODERATE 35: CPT | Performed by: INTERNAL MEDICINE

## 2020-12-03 PROCEDURE — 120N000013 HC R&B IMCU

## 2020-12-03 PROCEDURE — 999N000157 HC STATISTIC RCP TIME EA 10 MIN

## 2020-12-03 PROCEDURE — 250N000011 HC RX IP 250 OP 636: Performed by: HOSPITALIST

## 2020-12-03 PROCEDURE — 250N000013 HC RX MED GY IP 250 OP 250 PS 637: Performed by: HOSPITALIST

## 2020-12-03 PROCEDURE — 999N000215 HC STATISTIC HFNC ADULT NON-CPAP

## 2020-12-03 PROCEDURE — 250N000013 HC RX MED GY IP 250 OP 250 PS 637: Performed by: INTERNAL MEDICINE

## 2020-12-03 PROCEDURE — 85027 COMPLETE CBC AUTOMATED: CPT | Performed by: INTERNAL MEDICINE

## 2020-12-03 PROCEDURE — 36415 COLL VENOUS BLD VENIPUNCTURE: CPT | Performed by: INTERNAL MEDICINE

## 2020-12-03 RX ORDER — PANTOPRAZOLE SODIUM 40 MG/1
40 TABLET, DELAYED RELEASE ORAL
Status: DISCONTINUED | OUTPATIENT
Start: 2020-12-04 | End: 2020-12-04

## 2020-12-03 RX ADMIN — OXYCODONE HYDROCHLORIDE 5 MG: 5 TABLET ORAL at 18:58

## 2020-12-03 RX ADMIN — OXYCODONE HYDROCHLORIDE 10 MG: 5 TABLET ORAL at 09:13

## 2020-12-03 RX ADMIN — ENOXAPARIN SODIUM 30 MG: 30 INJECTION SUBCUTANEOUS at 09:14

## 2020-12-03 RX ADMIN — DEXAMETHASONE SODIUM PHOSPHATE 6 MG: 4 INJECTION, SOLUTION INTRAMUSCULAR; INTRAVENOUS at 09:13

## 2020-12-03 RX ADMIN — PRAVASTATIN SODIUM 40 MG: 20 TABLET ORAL at 21:16

## 2020-12-03 RX ADMIN — ACETAMINOPHEN 650 MG: 325 TABLET, FILM COATED ORAL at 09:13

## 2020-12-03 RX ADMIN — GUAIFENESIN AND CODEINE PHOSPHATE 5 ML: 10; 100 LIQUID ORAL at 09:13

## 2020-12-03 RX ADMIN — ENOXAPARIN SODIUM 30 MG: 30 INJECTION SUBCUTANEOUS at 21:16

## 2020-12-03 RX ADMIN — PANTOPRAZOLE SODIUM 40 MG: 40 INJECTION, POWDER, FOR SOLUTION INTRAVENOUS at 09:13

## 2020-12-03 ASSESSMENT — ACTIVITIES OF DAILY LIVING (ADL)
ADLS_ACUITY_SCORE: 18
ADLS_ACUITY_SCORE: 18
ADLS_ACUITY_SCORE: 23
ADLS_ACUITY_SCORE: 22
ADLS_ACUITY_SCORE: 18
ADLS_ACUITY_SCORE: 18

## 2020-12-03 ASSESSMENT — MIFFLIN-ST. JEOR: SCORE: 1081.2

## 2020-12-03 NOTE — PROGRESS NOTES
CLINICAL NUTRITION SERVICES - REASSESSMENT NOTE      Future/Additional Recommendations:   - May change protein supplements per pt preference   - Monitor wt trending    Malnutrition: (12/2)  % Weight Loss: Unable to determine --> Potential for 5% wt loss within 10 days this admit (?accuracy)   % Intake:  Decreased intake does not meet criteria for malnutrition   Subcutaneous Fat Loss: Unable to complete  Muscle Loss: Unable to complete  Fluid Retention: None documented     Malnutrition Diagnosis: Unable to determine due to inability complete physical exam per direction of department guidelines for COVID+ patients       EVALUATION OF PROGRESS TOWARD GOALS   Diet:  Regular diet + Gelatein Plus w/ lunch and strawberry smoothie w/ dinner daily     Intake/Tolerance:    Unable to visit patient today d/t COVID status  Chart reviewed, patient is documented to be taking between % meals listed below. Unable to determine if patient is consuming protein supplements at this time     Review of meals ordered:  11/29:   Breakfast: mashed potatoes, fruit, tea  Lunch: tea, fruit, rice, baked cod  Dinner: tea, fruit, rice, baked cod    11/30:  Breakfast: none  Lunch: mashed potatoes, fruit, tea, juice (Gelatein)  Dinner: chicken noodle soup, tea, yogurt (smoothie)    12/1:   Breakfast: fruit, yogurt, juice  Lunch: (Gelatein)  Dinner: hamburger, fruit (smoothie)    12/2:   Breakfast: chicken noodle soup, cod, rice, tea  Lunch: stir fernandez (Gelatein)  Dinner: fruit plate, rice, green beans (smoothie)     Recent Labs   Lab 12/02/20  1122 12/01/20  2136 12/01/20  1728 12/01/20  1220 12/01/20  0915 12/01/20  0844 12/01/20  0819 12/01/20  0139 11/30/20  0743 11/30/20  0743 11/29/20  0700 11/29/20  0700 11/28/20  0657 11/28/20  0657 11/27/20  0844 11/27/20  0844   *  --   --   --   --   --  83  --   --  98  --  100*  --  93  --  91   BGM  --  259* 133* 184* 89 76  --  111*   < >  --    < >  --    < >  --    < >  --     < > = values  in this interval not displayed.         ASSESSED NUTRITION NEEDS:  Dosing Weight 66 kg  Estimated Energy Needs: >/=1650 kcals (>/=25 Kcal/Kg)  Justification: minimum maintenance with active COVID  Estimated Protein Needs: >/=79 grams protein (1.2 g pro/Kg)  Justification: preservation of lean body mass with active COVID  Estimated Fluid Needs: per MD      NEW FINDINGS:   Wt: 62.7 kg - down 4 kg from admit (5% body wt in 10 days). She has not been on a diuretic   Vitals:    11/29/20 0500 11/30/20 0700 12/01/20 0544 12/02/20 1028   Weight: 66.1 kg (145 lb 11.6 oz) 66.1 kg (145 lb 12.8 oz) 50.3 kg (110 lb 14.4 oz) 62.1 kg (137 lb)    12/03/20 0957   Weight: 62.7 kg (138 lb 4.8 oz)       Previous Goals:   Patient to consume 75% of meals TID.   Evaluation: Likely Met    Previous Nutrition Diagnosis:   Predicted inadequate nutrient intake (protein) related to potential for decline in PO trends, specifically protein during admit.   Evaluation: No change       CURRENT NUTRITION DIAGNOSIS  Predicted inadequate nutrient intake (protein) related to potential for decline in PO trends, specifically protein during admit.     INTERVENTIONS  Recommendations / Nutrition Prescription  Continue diet as ordered  RN to change supplements pending patient preference     Implementation  Collaboration and Referral of Nutrition care: patient was discussed during interdisciplinary rounds     Goals  Pt will consume >/=75% meals TID   No further wt loss <62.7 kg during review period       MONITORING AND EVALUATION:  Progress towards goals will be monitored and evaluated per protocol and Practice Guidelines      Regina Drew RD, LD  Clinical Dietitian

## 2020-12-03 NOTE — PROGRESS NOTES
[facility]    Hospitalist Progress Note      Assessment & Plan   Day Russell is a 60 year old female who was admitted on 11/23/2020.    Summary of Stay:   Day Russell is a 60 year old female with past medical history of HTN, HLD, and GERD who was admitted on 11/23/2020 after presenting with shortness of breath and fevers for over 1 week and found to be hypoxic.  CTPA ruled out PE, but did show significant diffuse patchy groundglass opacities consistent with COVID-19 infection.  Reportedly she was diagnosed with COVID-19 on 11/19.       She continues to be on high flow nasal cannula.  But slowly the requirement is coming down. Overall appears stable.     Patient has been complaining of epigastric abdominal discomfort since yesterday.  Feels it is worse with coughing. No correlation with eating or activity.     On 12/2/20, patient had an episode of diaphoresis when she was getting up and moving with physical therapy.  It is not clear if she became hypoxic or not.  But she did have drop in the blood pressure with symptoms.  No further episodes.     Plan:     Acute hypoxic respiratory failure.  COVID-19 pneumonia.  -Completed remdesivir on 11/27/2020.  -Day 10 of dexamethasone was given today.  -Continues to be on high flow nasal cannula but slowly improving.  FiO2 is down to 40% today.  On 30 L flow.  -Overall comfortable.  Not in any distress.  No use of accessory muscles.  -On Lovenox 30 mg twice daily for DVT prophylaxis.  -No wheezing on exam.     Epigastric discomfort.  -Possibly dyspepsia/GERD.  -EKG done which was unremarkable.  Negative troponin.  -No obvious tenderness on exam in the epigastric or right upper quadrant area.  LFTs are normal.  Unlikely to be cholecystitis.  -Change Pepcid to Protonix.  Try Maalox.  -No EGD available in the past.     Leukocytosis  -Likely steroid effect.  No fever.  -Monitor.     Dyslipidemia  -Continue pravastatin.     Hyperglycemia  -In the  setting of acute issues plus dexamethasone use.  -Overall blood glucose numbers are fairly stable.  No history  of diabetes.        DVT Prophylaxis: Enoxaparin (Lovenox) SQ  Code Status: Full Code  Expected discharge: 3-4 days    Bret Kim MD  Text Page (7am - 6pm, M-F)    Interval History   Patient was evaluated with nursing staff. Overnight issues discussed.    Review of systems:  No nausea or vomiting.  No abdominal pain.  No diarrhea.  No chest pain/palpitations.  No new cough/shortness of breath.  No headache/visual disturbance/new weakness.    -Data reviewed today: Labs and medications.    Physical Exam   Temp: 96  F (35.6  C) Temp src: Oral BP: 99/59 Pulse: 70   Resp: 22 SpO2: 92 % O2 Device: High Flow Nasal Cannula (HFNC) Oxygen Delivery: 30 LPM  Vitals:    12/01/20 0544 12/02/20 1028 12/03/20 0957   Weight: 50.3 kg (110 lb 14.4 oz) 62.1 kg (137 lb) 62.7 kg (138 lb 4.8 oz)     Vital Signs with Ranges  Temp:  [95.7  F (35.4  C)-98.1  F (36.7  C)] 96  F (35.6  C)  Pulse:  [51-98] 70  Resp:  [16-24] 22  BP: ()/(59-86) 99/59  FiO2 (%):  [40 %-50 %] 40 %  SpO2:  [90 %-97 %] 92 %  I/O last 3 completed shifts:  In: 600 [P.O.:600]  Out: 1050 [Urine:1050]    Constitutional: Awake, alert, cooperative, no apparent distress  HEENT: Trachea midline, sclera is clear   Respiratory: No crackles. No wheezing. Equal breath sounds bilaterally.  Cardiovascular: Regular rate and rhythm, normal S1 and S2, and no murmur noted  GI: Normal bowel sounds, soft, non-distended, non-tender      Medications     - MEDICATION INSTRUCTIONS -       - MEDICATION INSTRUCTIONS -       - MEDICATION INSTRUCTIONS -       - MEDICATION INSTRUCTIONS -         enoxaparin ANTICOAGULANT  30 mg Subcutaneous BID     pantoprazole (PROTONIX) IV  40 mg Intravenous Daily with breakfast     pravastatin  40 mg Oral At Bedtime     sodium chloride (PF)  3 mL Intracatheter Q8H       Data   Recent Labs   Lab 12/03/20  0645 12/02/20  1522 12/02/20  1122  12/01/20  0819 11/30/20  0743 11/29/20  0700 11/28/20  0657   WBC 21.2*  --  20.5* 19.7* 18.8* 19.1* 14.4*   HGB 16.1*  --  14.3 16.2* 14.4 14.5 14.4   MCV 97  --  97 96 97 97 98   *  --  409 401 424 393 355   INR  --   --   --   --  1.08 1.10 1.12   NA  --   --  138 140 138 139 140   POTASSIUM  --   --  4.0 4.0 3.8 3.6 3.8   CHLORIDE  --   --  105 108 108 109 111*   CO2  --   --  26 25 27 26 26   BUN  --   --  28 15 17 16 16   CR  --   --  0.57 0.55 0.52 0.50* 0.54   ANIONGAP  --   --  7 7 3 4 3   LEELA  --   --  8.9 8.9 8.4* 8.5 8.4*   GLC  --   --  167* 83 98 100* 93   ALBUMIN  --   --  2.4*  --   --   --   --    PROTTOTAL  --   --  6.7*  --   --   --   --    BILITOTAL  --   --  0.3  --   --   --   --    ALKPHOS  --   --  77  --   --   --   --    ALT  --   --  27  --   --   --   --    AST  --   --  16  --   --   --   --    LIPASE  --   --  104  --   --   --   --    TROPI  --  <0.015 <0.015  --   --   --   --        No results found for this or any previous visit (from the past 24 hour(s)).

## 2020-12-03 NOTE — PLAN OF CARE
Admission: Pt admitted on 11/23 for evaluation of shortness of breath. Tested positive for COVID four days prior to admission   History: GERD, Hyperlipidemia   Labs/Protocols: Na-138, K+-4.0, Mag 2.4(12/1)  Vitals: Temp: 96.2  F (35.7  C) Temp src: Oral BP: 118/86 Pulse: 57   Resp: 16 SpO2: 97 % O2 Device: High Flow Nasal Cannula (HFNC) Oxygen Delivery: 30 LPM  Pain: Epigastric pain gave PRN oxycodone with relief   Tele: SR  Cardiac: Denies chest pain or tightness-Regular rate and rhythm   Respiratory: Lungs are diminished-Remains on HFNC at 30 liters-TALBOT with moving to bedside commode-Encouraged IS-Continues on decadron  Neuro: A&Ox4-Speaks some english- phone in room speaks Turkmen  GI/: Purewick in place with good output-Continent of bowel   Skin: Intact   LDA: 2 PIV saline locked   Diet: Regular-Fair appetite   Activity: A-1 walker and gait belt-PT consulted   Pt educated on current POC: Monitor respiratory status, Monitor labs, Continue with decadron, PT, Encourage IS   Discharge Plan: Home in 3-4 days

## 2020-12-03 NOTE — PROGRESS NOTES
RT:    Pt was able to be weaned from HFNC to 6 lpm oxymask.  She is tolerating this well.  RT to follow as needed.

## 2020-12-03 NOTE — PLAN OF CARE
Vss. Sats 92-93% on high flow. Productive cough with small amount of clear sputum. Pt states it feels like she has more in her chest. Alarms on. Sits at eob to eat. C/o epigastric pain right away this am. States it hurts to cough. Pain decrease with tylenol, oxycodone, cough medicine. Purwick in place. Tele SR.

## 2020-12-03 NOTE — PROGRESS NOTES
Pt remains on HFNC 30LPM/ 50% overnight for SOB. Skin integrity around nose, ears, and face is intact with no signs of breakdown. Respiratory will continue to follow.

## 2020-12-03 NOTE — PLAN OF CARE
VS stable. 95-97% on high flow oxygen. Diminished LS with infrequent nonproductive cough. No complaints of pain. Tele is sinus akash. Slept well throughout the night.

## 2020-12-04 ENCOUNTER — APPOINTMENT (OUTPATIENT)
Dept: PHYSICAL THERAPY | Facility: CLINIC | Age: 60
End: 2020-12-04
Payer: COMMERCIAL

## 2020-12-04 PROCEDURE — 250N000013 HC RX MED GY IP 250 OP 250 PS 637: Performed by: INTERNAL MEDICINE

## 2020-12-04 PROCEDURE — 250N000013 HC RX MED GY IP 250 OP 250 PS 637: Performed by: HOSPITALIST

## 2020-12-04 PROCEDURE — 99233 SBSQ HOSP IP/OBS HIGH 50: CPT | Performed by: INTERNAL MEDICINE

## 2020-12-04 PROCEDURE — 97530 THERAPEUTIC ACTIVITIES: CPT | Mod: GP

## 2020-12-04 PROCEDURE — 250N000011 HC RX IP 250 OP 636: Performed by: HOSPITALIST

## 2020-12-04 PROCEDURE — 200N000001 HC R&B ICU

## 2020-12-04 PROCEDURE — 120N000013 HC R&B IMCU

## 2020-12-04 RX ORDER — PANTOPRAZOLE SODIUM 40 MG/1
40 TABLET, DELAYED RELEASE ORAL
Status: DISCONTINUED | OUTPATIENT
Start: 2020-12-04 | End: 2020-12-05 | Stop reason: HOSPADM

## 2020-12-04 RX ORDER — MAGNESIUM CARB/ALUMINUM HYDROX 105-160MG
296 TABLET,CHEWABLE ORAL ONCE
Status: COMPLETED | OUTPATIENT
Start: 2020-12-04 | End: 2020-12-04

## 2020-12-04 RX ORDER — AMOXICILLIN 250 MG
1 CAPSULE ORAL 2 TIMES DAILY
Status: DISCONTINUED | OUTPATIENT
Start: 2020-12-04 | End: 2020-12-05 | Stop reason: HOSPADM

## 2020-12-04 RX ADMIN — OXYCODONE HYDROCHLORIDE 5 MG: 5 TABLET ORAL at 08:58

## 2020-12-04 RX ADMIN — PANTOPRAZOLE SODIUM 40 MG: 40 TABLET, DELAYED RELEASE ORAL at 06:24

## 2020-12-04 RX ADMIN — ALUMINUM HYDROXIDE, MAGNESIUM HYDROXIDE, AND SIMETHICONE 30 ML: 200; 200; 20 SUSPENSION ORAL at 09:35

## 2020-12-04 RX ADMIN — DOCUSATE SODIUM AND SENNOSIDES 1 TABLET: 8.6; 5 TABLET ORAL at 13:26

## 2020-12-04 RX ADMIN — ACETAMINOPHEN 650 MG: 325 TABLET, FILM COATED ORAL at 17:08

## 2020-12-04 RX ADMIN — MAGNESIUM CITRATE 296 ML: 1.75 LIQUID ORAL at 13:26

## 2020-12-04 RX ADMIN — DOCUSATE SODIUM AND SENNOSIDES 1 TABLET: 8.6; 5 TABLET ORAL at 21:26

## 2020-12-04 RX ADMIN — PRAVASTATIN SODIUM 40 MG: 20 TABLET ORAL at 21:26

## 2020-12-04 RX ADMIN — OXYCODONE HYDROCHLORIDE 5 MG: 5 TABLET ORAL at 17:08

## 2020-12-04 RX ADMIN — ENOXAPARIN SODIUM 30 MG: 30 INJECTION SUBCUTANEOUS at 08:21

## 2020-12-04 RX ADMIN — PANTOPRAZOLE SODIUM 40 MG: 40 TABLET, DELAYED RELEASE ORAL at 17:04

## 2020-12-04 ASSESSMENT — ACTIVITIES OF DAILY LIVING (ADL)
ADLS_ACUITY_SCORE: 22
ADLS_ACUITY_SCORE: 18
ADLS_ACUITY_SCORE: 18
ADLS_ACUITY_SCORE: 22
ADLS_ACUITY_SCORE: 23
ADLS_ACUITY_SCORE: 18

## 2020-12-04 ASSESSMENT — MIFFLIN-ST. JEOR: SCORE: 1060.79

## 2020-12-04 NOTE — PLAN OF CARE
VS stable. 96% on 8L 02. TALBOT. Diminished LS. No complaints of pain. Tele is sinus akash. Slept well throughout the night.

## 2020-12-04 NOTE — PLAN OF CARE
"Pt presented with shortness of breath, fever, hypoxia; diagnosed with COVID-19 infection. Pt has now completed remdesivir and decadron courses. Pt weaned from high flow nasal cannula to oximask currently at 6 LPM. /66 (BP Location: Right arm)   Pulse 65   Temp 96.9  F (36.1  C) (Oral)   Resp 18   Ht 1.448 m (4' 9\")   Wt 62.7 kg (138 lb 4.8 oz)   SpO2 95%   BMI 29.93 kg/m   C/o pain in epigastric area. Oxycodone given for pain management. Purewick in place for urinary incontinence. Piv saline locked. Tele SR. Will continue to wean supplemental oxygen as patient is able.   "

## 2020-12-04 NOTE — PROGRESS NOTES
[facility]             Hospitalist Progress Note    Name: Day Russell    MRN: [id number]  YOB: 1960    Age: 60 year old  Date of admission: 11/23/2020  Primary care provider: Keli Guzmán      Reason for Stay (Diagnosis): Acute hypoxic respiratory failure secondary to COVID-19 pneumonitis         Assessment and Plan:      Summary of Stay:  Day Russell is a 60 year old female with past medical history of HTN, HLD, and GERD who was admitted on 11/23/2020 after presenting with shortness of breath and fevers for over 1 week and found to be hypoxic.  CTPA ruled out PE, but did show significant diffuse patchy groundglass opacities consistent with COVID-19 infection.  Reportedly she was diagnosed with COVID-19 on 11/19/20.       She continues to be on high flow nasal cannula.  But slowly the requirement is coming down. Overall appears stable.     Patient has been complaining of epigastric abdominal discomfort since yesterday.  Feels it is worse with coughing. No correlation with eating or activity.     On 12/2/20, patient had an episode of diaphoresis when she was getting up and moving with physical therapy.  It is not clear if she became hypoxic or not.  But she did have drop in the blood pressure with symptoms.  No further episodes.    In terms of her COVID-19, patient clinically slowly improved.  Status post 10 days of dexamethasone and completed course of remdesivir on 11/27/2020.  FiO2 requirements coming down to nasal cannula at 6 L for now.  Subjectively also improved.    Primary diagnoses    Acute hypoxic respiratory failure secondary to COVID-19 pneumonitis  -Status post remdesivir on 11/27/2020 and 10 days of IV Decadron  -Currently on 6 L nasal cannula.  Continue to wean off FiO2 as able but will likely need home oxygen at discharge  -We will decrease Lovenox dosing to prophylactic dosing 40 mg subcu every 24 hours    Epigastric discomfort  Wonder about  "gastritis/dyspepsia/nonbleeding peptic ulcer disease/GERD versus pleuritic component from COVID-19 pneumonitis as it does hurt to take a deeper breath (pleurisy) versus just being constipated.  Nonsurgical abdomen on exam.  Patient reports no bowel movement for 3 days.  -We will give a dose of mag citrate  -Work-up for cardiac source and biliary source is negative.  LFTs and lipase are normal  -Increase Protonix to twice daily  -Continue symptomatic management    Leukocytosis  Likely steroid effect without evidence of acute sepsis  -Continue to monitor    Chronic medical conditions    Dyslipidemia  Hyperglycemia, steroid-induced    -Resume chronic medications as ordered    DVT Prophylaxis: Enoxaparin (Lovenox) SQ  Code Status: Full code  Discharge Dispo: Home with or without oxygen  Estimated Disch Date / # of Days until Disch: Over the weekend if ideally FiO2 can come down to less than 4 L    Time spent: Greater than 35 minutes which included greater than 80% on education, counseling, reviewing her charts, and discussing plan of cares with patient utilizing professional phone         Interval History (Subjective):      History obtained with assistance of home .  Breathing continues to improve.  Still reports a nonproductive cough.  Still some mild dyspnea on exertion but happy to see that she is coming down on her FiO2 requirements.  Still reporting some epigastric discomfort, especially with taking some deep breath         Physical Exam:      Vital signs:  Temp: 95.9  F (35.5  C) Temp src: Oral BP: 117/74 Pulse: 59   Resp: 18 SpO2: 94 % O2 Device: Nasal cannula Oxygen Delivery: 6 LPM Height: 144.8 cm (4' 9\") Weight: 60.7 kg (133 lb 12.8 oz)  Estimated body mass index is 28.95 kg/m  as calculated from the following:    Height as of this encounter: 1.448 m (4' 9\").    Weight as of this encounter: 60.7 kg (133 lb 12.8 oz).    I/O last 3 completed shifts:  In: 1080 [P.O.:1080]  Out: 1400 " [Urine:1400]  Vitals:    11/30/20 0700 12/01/20 0544 12/02/20 1028 12/03/20 0957   Weight: 66.1 kg (145 lb 12.8 oz) 50.3 kg (110 lb 14.4 oz) 62.1 kg (137 lb) 62.7 kg (138 lb 4.8 oz)    12/04/20 0513   Weight: 60.7 kg (133 lb 12.8 oz)       Constitutional: Awake, alert, cooperative, no apparent distress     Respiratory: Nl work of breathing.  Coarse breath sounds bilaterally   Cardiovascular: Regular rate and rhythm, normal S1 and S2, and no murmur noted       Skin: No rashes, no cyanosis, dry to touch   Neuro: CN 2-12 intact, no localizing weakness   Extremities: No edema, normal range of motion   HEENT Normocephalic, atraumatic, normal nasal turbinates; oropharynx clear   Neck Supple; nl inspection; trachea midline; no thryomegaly   Psychiatric: A+O x3. Normal affect          Medications:      All current medications were reviewed with changes reflected in problem list.         Data:      All new lab and imaging data was reviewed.   Labs:  No results for input(s): CULT in the last 168 hours.  Recent Labs   Lab 12/03/20  0645 12/02/20  1122 12/01/20  0819   WBC 21.2* 20.5* 19.7*   HGB 16.1* 14.3 16.2*   HCT 48.2* 43.6 48.1*   MCV 97 97 96   * 409 401     Recent Labs   Lab 12/02/20  1122 12/01/20  0819 11/30/20  0743    140 138   POTASSIUM 4.0 4.0 3.8   CHLORIDE 105 108 108   CO2 26 25 27   ANIONGAP 7 7 3   * 83 98   BUN 28 15 17   CR 0.57 0.55 0.52   GFRESTIMATED >90 >90 >90   GFRESTBLACK >90 >90 >90   LEELA 8.9 8.9 8.4*   MAG  --  2.4*  --    PROTTOTAL 6.7*  --   --    ALBUMIN 2.4*  --   --    BILITOTAL 0.3  --   --    ALKPHOS 77  --   --    AST 16  --   --    ALT 27  --   --       Imaging:   No results found for this or any previous visit (from the past 24 hour(s)).    Eddie Greenberg -[phone]

## 2020-12-04 NOTE — PLAN OF CARE
Admission: Pt admitted on 11/23 for evaluation of shortness of breath. Tested positive for COVID four days prior to admission   History: GERD, Hyperlipidemia   Labs/Protocols: Na-138(12/2), K+-4.0(12/2), Mag 2.4(12/1)  Vitals: Temp: 95.9  F (35.5  C) Temp src: Oral BP: 117/74 Pulse: 59   Resp: 18 SpO2: 94 % O2 Device: Nasal cannula Oxygen Delivery: 6 LPM  Pain: Epigastric pain gave PRN oxycodone with relief   Tele: SR  Cardiac: Denies chest pain or tightness-Regular rate and rhythm   Respiratory: Lungs are diminished-TALBOT with moving to bedside commode-Encouraged IS  Neuro: A&Ox4-Speaks some english- phone in room speaks Dean  GI/: Purewick in place with good output-Continent of bowel   Skin: Intact   LDA: 2 PIV saline locked   Diet: Regular-Fair appetite   Activity: A-1 walker and gait belt-PT consulted   Pt educated on current POC: Monitor respiratory status, Monitor labs, PT, Encourage IS   Discharge Plan: Home in 3-4 days

## 2020-12-05 ENCOUNTER — APPOINTMENT (OUTPATIENT)
Dept: PHYSICAL THERAPY | Facility: CLINIC | Age: 60
End: 2020-12-05
Payer: COMMERCIAL

## 2020-12-05 VITALS
RESPIRATION RATE: 18 BRPM | HEIGHT: 57 IN | SYSTOLIC BLOOD PRESSURE: 106 MMHG | BODY MASS INDEX: 28.87 KG/M2 | OXYGEN SATURATION: 96 % | DIASTOLIC BLOOD PRESSURE: 72 MMHG | WEIGHT: 133.8 LBS | TEMPERATURE: 97.6 F | HEART RATE: 100 BPM

## 2020-12-05 PROCEDURE — 250N000013 HC RX MED GY IP 250 OP 250 PS 637: Performed by: INTERNAL MEDICINE

## 2020-12-05 PROCEDURE — 250N000011 HC RX IP 250 OP 636: Performed by: INTERNAL MEDICINE

## 2020-12-05 PROCEDURE — 250N000013 HC RX MED GY IP 250 OP 250 PS 637: Performed by: HOSPITALIST

## 2020-12-05 PROCEDURE — 99238 HOSP IP/OBS DSCHRG MGMT 30/<: CPT | Performed by: INTERNAL MEDICINE

## 2020-12-05 PROCEDURE — 97530 THERAPEUTIC ACTIVITIES: CPT | Mod: GP | Performed by: PHYSICAL THERAPIST

## 2020-12-05 RX ORDER — OXYCODONE HYDROCHLORIDE 5 MG/1
5-10 TABLET ORAL EVERY 6 HOURS PRN
Qty: 30 TABLET | Refills: 0 | Status: SHIPPED | OUTPATIENT
Start: 2020-12-05

## 2020-12-05 RX ORDER — AMOXICILLIN 250 MG
1 CAPSULE ORAL 2 TIMES DAILY
Qty: 14 TABLET | Refills: 0 | Status: SHIPPED | OUTPATIENT
Start: 2020-12-05

## 2020-12-05 RX ORDER — PANTOPRAZOLE SODIUM 40 MG/1
40 TABLET, DELAYED RELEASE ORAL
Qty: 30 TABLET | Refills: 0 | Status: SHIPPED | OUTPATIENT
Start: 2020-12-05

## 2020-12-05 RX ORDER — CODEINE PHOSPHATE AND GUAIFENESIN 10; 100 MG/5ML; MG/5ML
1-2 SOLUTION ORAL EVERY 4 HOURS PRN
Qty: 180 ML | Refills: 0 | Status: SHIPPED | OUTPATIENT
Start: 2020-12-05

## 2020-12-05 RX ORDER — ALBUTEROL SULFATE 90 UG/1
2 AEROSOL, METERED RESPIRATORY (INHALATION) EVERY 4 HOURS PRN
Qty: 1 INHALER | Refills: 0 | Status: SHIPPED | OUTPATIENT
Start: 2020-12-05

## 2020-12-05 RX ADMIN — ALUMINUM HYDROXIDE, MAGNESIUM HYDROXIDE, AND SIMETHICONE 30 ML: 200; 200; 20 SUSPENSION ORAL at 04:39

## 2020-12-05 RX ADMIN — OXYCODONE HYDROCHLORIDE 5 MG: 5 TABLET ORAL at 08:34

## 2020-12-05 RX ADMIN — DOCUSATE SODIUM AND SENNOSIDES 1 TABLET: 8.6; 5 TABLET ORAL at 08:28

## 2020-12-05 RX ADMIN — GUAIFENESIN AND CODEINE PHOSPHATE 5 ML: 10; 100 LIQUID ORAL at 08:34

## 2020-12-05 RX ADMIN — PANTOPRAZOLE SODIUM 40 MG: 40 TABLET, DELAYED RELEASE ORAL at 06:25

## 2020-12-05 RX ADMIN — ENOXAPARIN SODIUM 40 MG: 40 INJECTION SUBCUTANEOUS at 08:28

## 2020-12-05 ASSESSMENT — ACTIVITIES OF DAILY LIVING (ADL)
ADLS_ACUITY_SCORE: 23
ADLS_ACUITY_SCORE: 18
ADLS_ACUITY_SCORE: 22
ADLS_ACUITY_SCORE: 23
ADLS_ACUITY_SCORE: 22

## 2020-12-05 NOTE — PROGRESS NOTES
Oxygen Documentation:   I certify that this patient, Day Russell has been under my care (or a nurse practitioner or physican's assistant working with me). This is the face-to-face encounter for oxygen medical necessity.      Day Russell is now in a chronic stable state and continues to require supplemental oxygen. Patient has continued oxygen desaturation due to Covid 19 pneumonitis    Alternative treatment(s) tried or considered and deemed clinically infective for treatment of Covid 19 pneumonitis include nebulizers, inhalers and steroids.  If portability is ordered, is the patient mobile within the home? yes    **Patients who qualify for home O2 coverage under the CMS guidelines require ABG tests or O2 sat readings obtained closest to, but no earlier than 2 days prior to the discharge, as evidence of the need for home oxygen therapy. Testing must be performed while patient is in the chronic stable state. See notes for O2 sats.**

## 2020-12-05 NOTE — PROGRESS NOTES
Patient seen and examined.  Breathing overall better.  Denies any significant cough and reports that epigastric abdominal discomfort is improved.  1 bowel movement yesterday after laxatives.  Just reports limited p.o. intake.  Feels ready to go home.  O2 sats are stable on 5 L continuously.  History obtained by using professional phone .  I also contacted patient's daughter and updated her that patient can go home today on home O2 supplementation with primary MD follow-up.  Home O2 along with home care RN and PT ordered.  Discharge later today once home O2 is arranged.  Full discharge summary to follow.

## 2020-12-05 NOTE — DISCHARGE INSTRUCTIONS
Your home care referral was sent to [facility]  If you haven't heard from them within the next 24-48 hours,   Please call them at [phone]    Your home oxygen is being supplied through [facility]. Please call them with any questions or issues at [phone]

## 2020-12-05 NOTE — PLAN OF CARE
Pt PIV removed. Pure wick discontinued. Belongings given to patient. Pt wheeled downstairs, daughter waiting, signed receiving medications and discharge instructions. Daughter to pick eloquis medication from [facility]. Oxygen concentrator and tank with patient.

## 2020-12-05 NOTE — PLAN OF CARE
Patient has been assessed for Home Oxygen needs. Oxygen readings:    *Pulse oximetry (SpO2) = 90% on room air at rest while awake.    *SpO2 = 85% on room air during activity/with exercise.    *SpO2 improved to 93% on 5 liters/minute at rest.    *SpO2 improved to 92% on 5 liters/minute during activity/with exercise.       Admission: Pt admitted on 11/23 for evaluation of shortness of breath. Tested positive for COVID four days prior to admission   History: GERD, Hyperlipidemia   Labs/Protocols: Na-138(12/2), K+-4.0(12/2), Mag 2.4(12/1)  Vitals: Temp: 97.6  F (36.4  C) Temp src: Axillary BP: 105/63 Pulse: 81   Resp: 18 SpO2: 93 % O2 Device: Nasal cannula Oxygen Delivery: 5 LPM  Pain: Epigastric pain gave PRN oxycodone and robitussin with relief   Cardiac: Denies chest pain or tightness-Regular rate and rhythm   Respiratory: Lungs are diminished-TALBOT with moving to bedside commode-Encouraged IS  Neuro: A&Ox4-Speaks some english- phone in room speaks Occitan  GI/: Continent of B/B  Skin: Intact   LDA: 2 PIV saline locked   Diet: Regular-Fair appetite   Activity: A-1 walker and gait belt-PT consulted   Pt educated on current POC: Monitor respiratory status, Monitor labs, PT, Encourage IS   Discharge Plan: Home today with oxygen

## 2020-12-05 NOTE — PLAN OF CARE
Vitals stable. No complaints of pain. Oxygen titrated to 5 L NC, lungs sounds are very diminished. Appetite is improving. Requested purewick tonight while she is sleeping. See flow sheets for further details.

## 2020-12-05 NOTE — PLAN OF CARE
A/O x 4; Dean-speaking,  phone utilized at bedside. VSS on 5L O2 per NC. C/O epigastric pain, PRN PO maalox given w/ some relief. No tele, denied CP . LS dim, TALBOT. PIV to left intact, SL . Up SBA GB W to bedside commode, had one loose stool. Purewick in place per pt request. Maintained covid iso precautions. Will continue POC.

## 2020-12-05 NOTE — CONSULTS
Care Management Discharge Note    Discharge Date: 12/07/20       Discharge Disposition:  Home    Discharge Services:  None    Discharge DME:  Home O2    Discharge Transportation:  Daughter (Pattie)    Private pay costs discussed: Not applicable      Education Provided on the Discharge Plan:  Yes  Persons Notified of Discharge Plans: Patient, daughter   Patient/Family in Agreement with the Plan:  Yes      Additional Information:  RN called [facility] to order and setup O2 for the patient. [facility] called RN back and verified information about the patient. [facility] will call the patient and follow-up with O2 instructions.   RN tried calling the patient's bedside phone and mobile phone two separate times with no answer. RN called the patient's son with no answer. RN was able to call the patient's daughter Pattie and verified the home address and PCP of the patient. RN informed Renezahraajennifer about the home O2 ordered with [facility]. [facility] home care RN/PT and asked about agency choice. Pattie stated that she did not care which agency was chosen. RN called [facility] but the agency declined due to no staffing availability. RN sent [facility] a referral. Contact information for home care added to AVS. No further concerns to address at this time.       Sarah Parker RN   Sarah Parker RN Case Manager  Inpatient Care Coordination  [facility]   [phone]

## 2020-12-06 NOTE — PLAN OF CARE
Physical Therapy Discharge Summary    Reason for therapy discharge:    Discharged to home with home therapy.    Progress towards therapy goal(s). See goals on Care Plan in [facility] electronic health record for goal details.  Goals partially met.  Barriers to achieving goals:   discharge from facility.    Therapy recommendation(s):    Continued therapy is recommended.  Rationale/Recommendations:  HHPT as indicated. Would benefit from OP Pulmonary rehab once able to tolerate out of the home mobility.    Note: Pt not seen by documenting PT on this date. Information obtained from chart review.

## 2020-12-06 NOTE — DISCHARGE SUMMARY
[facility]     DISCHARGE SUMMARY          Day Russell MRN# [id number]   YOB: 1960 Age: 60 year old     Date of Admission:  11/23/2020  Date of Discharge:  12/5/2020  4:48 PM  Admitting Physician:  José Miguel Carter MD  Discharge Physician: Eddie Greenberg MD  Discharging Service: Hospitalist     Primary Provider: Michelle Meyer  Primary Care Physician Phone Number: [phone]         Discharge Diagnoses/Problem Oriented Hospital Course (Providers):      Day Russell was admitted on 11/23/2020 by José Miguel Carter MD and I would refer you to their history and physical.      Patient was seen and examined on the day of discharge    Discharge Diagnoses    1. Acute COVID-19 pneumonitis  2. Epigastric discomfort  3. Leukocytosis, suspect steroid effect  4. Hyperlipidemia  5. Hyperglycemia, steroid-induced      Hospital Course:    Day Russell is a 60 year old female with past medical history of HTN, HLD, and GERD who was admitted on 11/23/2020 after presenting with shortness of breath and fevers for over 1 week prior to admission and was notably hypoxic in the ED.  CT of the chest ruled out PE, but did show significant diffuse patchy groundglass opacities consistent with COVID-19 infection.  Reportedly she was diagnosed with COVID-19 on 11/19/20.  She was admitted and started on conventional treatment with IV Decadron and remdesivir.      It was noted that during her admission on 12/2/20, patient had an episode of diaphoresis when she was getting up and moving with physical therapy.  It is not clear if she became hypoxic or not.  But she did have drop in the blood pressure with symptoms.  No further episodes.     In terms of her COVID-19, patient clinically slowly improved.  Slowly FiO2 requirements did come down from high flow to facemask to nasal cannula and was doing quite well at 5 L/min for the past 24 hours prior to discharge.she is now status post 10 days of  dexamethasone and completed course of remdesivir on 11/27/2020.      During her hospitalization stay, she did report some epigastric discomfort.  LFTs were normal as well as lipase.  Suspect could be some gastritis related to utilization of steroids and patient was placed on a PPI.  Additionally constipation could have been playing a component.  On the day of discharge, patient reports that her epigastric pain is much improved but reports only a limited appetite and early satiety.    At discharge, patient will be placed on 30 days of apixaban for DVT Prophylaxis and instructed to stay away from NSAIDs.           Pending Results:        Unresulted Labs Ordered in the Past 30 Days of this Admission     No orders found from 10/24/2020 to 11/24/2020.            Discharge Instructions and Follow-Up:      Follow-up Appointments     Follow-up and recommended labs and tests       Follow up with primary care provider, Keli Guzmán, within 7 days for   hospital follow- up.  No follow up labs or test are needed.               Discharge Disposition:        Discharged to home         Discharge Medications:        Discharge Medication List as of 12/5/2020  3:57 PM      START taking these medications    Details   albuterol (PROAIR HFA/PROVENTIL HFA/VENTOLIN HFA) 108 (90 Base) MCG/ACT inhaler Inhale 2 puffs into the lungs every 4 hours as needed for shortness of breath / dyspnea or wheezing With spacer, Disp-1 Inhaler, R-0, E-PrescribePharmacy may dispense brand covered by insurance (Proair, or proventil or ventolin or generic albuterol inha ler)      guaiFENesin-codeine (ROBITUSSIN AC) 100-10 MG/5ML solution Take 5-10 mLs by mouth every 4 hours as needed for cough, Disp-180 mL, R-0, Local Print      oxyCODONE (ROXICODONE) 5 MG tablet Take 1-2 tablets (5-10 mg) by mouth every 6 hours as needed for moderate to severe pain, Disp-30 tablet, R-0, Local Print      pantoprazole (PROTONIX) 40 MG EC tablet Take 1 tablet (40 mg) by mouth 2  times daily (before meals), Disp-30 tablet, R-0, E-Prescribe      senna-docusate (SENOKOT-S/PERICOLACE) 8.6-50 MG tablet Take 1 tablet by mouth 2 times daily, Disp-14 tablet, R-0, E-Prescribe      apixaban ANTICOAGULANT (ELIQUIS) 2.5 MG tablet Take 1 tablet (2.5 mg) by mouth 2 times daily, Disp-60 tablet, R-0, Local Print         CONTINUE these medications which have NOT CHANGED    Details   acetaminophen (TYLENOL) 325 MG tablet Take 325-650 mg by mouth every 6 hours as needed for mild pain, Historical      pravastatin (PRAVACHOL) 40 MG tablet Take 40 mg by mouth At Bedtime, Historical      UNKNOWN TO PATIENT Take 1 tablet by mouth At Bedtime Cough Medciation OTC--name unknown per patient, Historical               Allergies:         Allergies   Allergen Reactions     No Known Drug Allergies            Consultations This Hospital Stay:        No consultations were requested during this admission           Image Results From This Hospital Stay (For Non-EPIC Providers):        Results for orders placed or performed during the hospital encounter of 11/23/20   CT Chest Pulmonary Embolism w Contrast    Narrative    CT CHEST PULMONARY EMBOLISM WITH CONTRAST   11/23/2020 2:04 PM     HISTORY: Shortness of breath, fatigue for the past 3-4 days. Low back  pain.    TECHNIQUE:  CT of the chest was performed following the administration  of 54 mL Isovue-370. Radiation dose for this scan was reduced using  automated exposure control, adjustment of the mA and/or kV according  to patient size, or iterative reconstruction technique.    COMPARISON: None.    FINDINGS:  Images are mildly degraded by motion artifact. No definite  pulmonary embolism identified.    There are diffuse patchy and confluent groundglass opacities scattered  throughout the lungs. These are most predominant in the lower lobes.  Mildly prominent paratracheal lymph nodes.      Impression    IMPRESSION:  1. No evidence for pulmonary embolism.  2. Diffuse patchy and  confluent groundglass opacities in the lungs.  Findings would be most consistent with a multifocal pneumonia as can  be seen with COVID-19.    KETAN DAVID MD